# Patient Record
Sex: MALE | Race: WHITE | URBAN - METROPOLITAN AREA
[De-identification: names, ages, dates, MRNs, and addresses within clinical notes are randomized per-mention and may not be internally consistent; named-entity substitution may affect disease eponyms.]

---

## 2017-12-08 ENCOUNTER — HOSPITAL ENCOUNTER (INPATIENT)
Facility: CLINIC | Age: 82
LOS: 3 days | Discharge: NURSING, REHAB OR LONG-TERM CARE | DRG: 071 | End: 2017-12-12
Attending: EMERGENCY MEDICINE | Admitting: INTERNAL MEDICINE
Payer: COMMERCIAL

## 2017-12-08 ENCOUNTER — MEDICAL CORRESPONDENCE (OUTPATIENT)
Dept: HEALTH INFORMATION MANAGEMENT | Facility: CLINIC | Age: 82
End: 2017-12-08

## 2017-12-08 DIAGNOSIS — E86.0 DEHYDRATION: ICD-10-CM

## 2017-12-08 DIAGNOSIS — G93.40 ENCEPHALOPATHY: Primary | ICD-10-CM

## 2017-12-08 DIAGNOSIS — R30.0 DYSURIA: ICD-10-CM

## 2017-12-08 DIAGNOSIS — R53.1 LEFT-SIDED WEAKNESS: ICD-10-CM

## 2017-12-08 DIAGNOSIS — R79.89 PRERENAL AZOTEMIA: ICD-10-CM

## 2017-12-08 DIAGNOSIS — D64.9 ANEMIA, UNSPECIFIED TYPE: ICD-10-CM

## 2017-12-08 DIAGNOSIS — F03.90 DEMENTIA WITHOUT BEHAVIORAL DISTURBANCE, UNSPECIFIED DEMENTIA TYPE: ICD-10-CM

## 2017-12-08 DIAGNOSIS — M19.90 ARTHRITIS: ICD-10-CM

## 2017-12-08 LAB
ALBUMIN UR-MCNC: NEGATIVE MG/DL
ANION GAP SERPL CALCULATED.3IONS-SCNC: 8 MMOL/L (ref 3–14)
APPEARANCE UR: CLEAR
BACTERIA #/AREA URNS HPF: ABNORMAL /HPF
BASOPHILS # BLD AUTO: 0 10E9/L (ref 0–0.2)
BASOPHILS NFR BLD AUTO: 0.1 %
BILIRUB UR QL STRIP: NEGATIVE
BUN SERPL-MCNC: 23 MG/DL (ref 7–30)
CALCIUM SERPL-MCNC: 8.6 MG/DL (ref 8.5–10.1)
CHLORIDE SERPL-SCNC: 101 MMOL/L (ref 94–109)
CO2 SERPL-SCNC: 25 MMOL/L (ref 20–32)
COLOR UR AUTO: YELLOW
CREAT SERPL-MCNC: 1.19 MG/DL (ref 0.66–1.25)
DIFFERENTIAL METHOD BLD: ABNORMAL
EOSINOPHIL # BLD AUTO: 0 10E9/L (ref 0–0.7)
EOSINOPHIL NFR BLD AUTO: 0.4 %
ERYTHROCYTE [DISTWIDTH] IN BLOOD BY AUTOMATED COUNT: 13.9 % (ref 10–15)
GFR SERPL CREATININE-BSD FRML MDRD: 57 ML/MIN/1.7M2
GLUCOSE SERPL-MCNC: 120 MG/DL (ref 70–99)
GLUCOSE UR STRIP-MCNC: NEGATIVE MG/DL
HCT VFR BLD AUTO: 30.5 % (ref 40–53)
HGB BLD-MCNC: 9.9 G/DL (ref 13.3–17.7)
HGB UR QL STRIP: NEGATIVE
IMM GRANULOCYTES # BLD: 0 10E9/L (ref 0–0.4)
IMM GRANULOCYTES NFR BLD: 0.1 %
INTERPRETATION ECG - MUSE: NORMAL
KETONES UR STRIP-MCNC: NEGATIVE MG/DL
LEUKOCYTE ESTERASE UR QL STRIP: ABNORMAL
LYMPHOCYTES # BLD AUTO: 0.8 10E9/L (ref 0.8–5.3)
LYMPHOCYTES NFR BLD AUTO: 11.3 %
MCH RBC QN AUTO: 28.6 PG (ref 26.5–33)
MCHC RBC AUTO-ENTMCNC: 32.5 G/DL (ref 31.5–36.5)
MCV RBC AUTO: 88 FL (ref 78–100)
MONOCYTES # BLD AUTO: 0.5 10E9/L (ref 0–1.3)
MONOCYTES NFR BLD AUTO: 7.4 %
NEUTROPHILS # BLD AUTO: 5.8 10E9/L (ref 1.6–8.3)
NEUTROPHILS NFR BLD AUTO: 80.7 %
NITRATE UR QL: NEGATIVE
NRBC # BLD AUTO: 0 10*3/UL
NRBC BLD AUTO-RTO: 0 /100
PH UR STRIP: 8 PH (ref 5–7)
PLATELET # BLD AUTO: 189 10E9/L (ref 150–450)
POTASSIUM SERPL-SCNC: 4.8 MMOL/L (ref 3.4–5.3)
RBC # BLD AUTO: 3.46 10E12/L (ref 4.4–5.9)
RBC #/AREA URNS AUTO: ABNORMAL /HPF
SODIUM SERPL-SCNC: 134 MMOL/L (ref 133–144)
SOURCE: ABNORMAL
SP GR UR STRIP: 1.01 (ref 1–1.03)
UROBILINOGEN UR STRIP-ACNC: 0.2 EU/DL (ref 0.2–1)
WBC # BLD AUTO: 7.3 10E9/L (ref 4–11)
WBC #/AREA URNS AUTO: ABNORMAL /HPF

## 2017-12-08 PROCEDURE — 93005 ELECTROCARDIOGRAM TRACING: CPT

## 2017-12-08 PROCEDURE — 25000132 ZZH RX MED GY IP 250 OP 250 PS 637: Performed by: INTERNAL MEDICINE

## 2017-12-08 PROCEDURE — 96375 TX/PRO/DX INJ NEW DRUG ADDON: CPT

## 2017-12-08 PROCEDURE — 87086 URINE CULTURE/COLONY COUNT: CPT | Performed by: EMERGENCY MEDICINE

## 2017-12-08 PROCEDURE — 25000128 H RX IP 250 OP 636: Performed by: EMERGENCY MEDICINE

## 2017-12-08 PROCEDURE — 80048 BASIC METABOLIC PNL TOTAL CA: CPT | Performed by: EMERGENCY MEDICINE

## 2017-12-08 PROCEDURE — G0378 HOSPITAL OBSERVATION PER HR: HCPCS

## 2017-12-08 PROCEDURE — 87088 URINE BACTERIA CULTURE: CPT | Performed by: EMERGENCY MEDICINE

## 2017-12-08 PROCEDURE — 99285 EMERGENCY DEPT VISIT HI MDM: CPT | Mod: 25

## 2017-12-08 PROCEDURE — 96365 THER/PROPH/DIAG IV INF INIT: CPT

## 2017-12-08 PROCEDURE — 81001 URINALYSIS AUTO W/SCOPE: CPT | Performed by: EMERGENCY MEDICINE

## 2017-12-08 PROCEDURE — 96361 HYDRATE IV INFUSION ADD-ON: CPT

## 2017-12-08 PROCEDURE — 27210995 ZZH RX 272: Performed by: EMERGENCY MEDICINE

## 2017-12-08 PROCEDURE — 85025 COMPLETE CBC W/AUTO DIFF WBC: CPT | Performed by: EMERGENCY MEDICINE

## 2017-12-08 PROCEDURE — 96376 TX/PRO/DX INJ SAME DRUG ADON: CPT

## 2017-12-08 PROCEDURE — 99220 ZZC INITIAL OBSERVATION CARE,LEVL III: CPT | Performed by: INTERNAL MEDICINE

## 2017-12-08 PROCEDURE — 87186 SC STD MICRODIL/AGAR DIL: CPT | Performed by: EMERGENCY MEDICINE

## 2017-12-08 PROCEDURE — 99207 ZZC MOONLIGHTING INDICATOR: CPT | Performed by: INTERNAL MEDICINE

## 2017-12-08 RX ORDER — ACETAMINOPHEN 500 MG
1000 TABLET ORAL 3 TIMES DAILY
Status: DISCONTINUED | OUTPATIENT
Start: 2017-12-08 | End: 2017-12-12 | Stop reason: HOSPADM

## 2017-12-08 RX ORDER — ONDANSETRON 2 MG/ML
4 INJECTION INTRAMUSCULAR; INTRAVENOUS ONCE
Status: COMPLETED | OUTPATIENT
Start: 2017-12-08 | End: 2017-12-08

## 2017-12-08 RX ORDER — ONDANSETRON 4 MG/1
4 TABLET, ORALLY DISINTEGRATING ORAL EVERY 6 HOURS PRN
Status: DISCONTINUED | OUTPATIENT
Start: 2017-12-08 | End: 2017-12-12 | Stop reason: HOSPADM

## 2017-12-08 RX ORDER — CEFTRIAXONE SODIUM 1 G/1
1 INJECTION, POWDER, FOR SOLUTION INTRAMUSCULAR; INTRAVENOUS EVERY 24 HOURS
Status: DISCONTINUED | OUTPATIENT
Start: 2017-12-09 | End: 2017-12-12 | Stop reason: HOSPADM

## 2017-12-08 RX ORDER — SODIUM CHLORIDE 9 MG/ML
INJECTION, SOLUTION INTRAVENOUS CONTINUOUS
Status: DISCONTINUED | OUTPATIENT
Start: 2017-12-08 | End: 2017-12-10

## 2017-12-08 RX ORDER — ONDANSETRON 2 MG/ML
4 INJECTION INTRAMUSCULAR; INTRAVENOUS EVERY 6 HOURS PRN
Status: DISCONTINUED | OUTPATIENT
Start: 2017-12-08 | End: 2017-12-12 | Stop reason: HOSPADM

## 2017-12-08 RX ORDER — HYDROCODONE BITARTRATE AND ACETAMINOPHEN 5; 325 MG/1; MG/1
1-2 TABLET ORAL EVERY 4 HOURS PRN
Status: DISCONTINUED | OUTPATIENT
Start: 2017-12-08 | End: 2017-12-11

## 2017-12-08 RX ORDER — ALUMINA, MAGNESIA, AND SIMETHICONE 2400; 2400; 240 MG/30ML; MG/30ML; MG/30ML
15 SUSPENSION ORAL
Status: DISCONTINUED | OUTPATIENT
Start: 2017-12-08 | End: 2017-12-12 | Stop reason: HOSPADM

## 2017-12-08 RX ORDER — LANOLIN ALCOHOL/MO/W.PET/CERES
3 CREAM (GRAM) TOPICAL AT BEDTIME
Status: DISCONTINUED | OUTPATIENT
Start: 2017-12-08 | End: 2017-12-12 | Stop reason: HOSPADM

## 2017-12-08 RX ORDER — METOPROLOL TARTRATE 25 MG/1
25 TABLET, FILM COATED ORAL DAILY
Status: DISCONTINUED | OUTPATIENT
Start: 2017-12-09 | End: 2017-12-12 | Stop reason: HOSPADM

## 2017-12-08 RX ORDER — SERTRALINE HYDROCHLORIDE 25 MG/1
25 TABLET, FILM COATED ORAL DAILY
Status: DISCONTINUED | OUTPATIENT
Start: 2017-12-09 | End: 2017-12-12 | Stop reason: HOSPADM

## 2017-12-08 RX ORDER — NALOXONE HYDROCHLORIDE 0.4 MG/ML
.1-.4 INJECTION, SOLUTION INTRAMUSCULAR; INTRAVENOUS; SUBCUTANEOUS
Status: DISCONTINUED | OUTPATIENT
Start: 2017-12-08 | End: 2017-12-09

## 2017-12-08 RX ORDER — ACETAMINOPHEN 325 MG/1
650 TABLET ORAL EVERY 4 HOURS PRN
Status: DISCONTINUED | OUTPATIENT
Start: 2017-12-08 | End: 2017-12-12 | Stop reason: HOSPADM

## 2017-12-08 RX ORDER — TAMSULOSIN HYDROCHLORIDE 0.4 MG/1
0.4 CAPSULE ORAL DAILY
Status: DISCONTINUED | OUTPATIENT
Start: 2017-12-09 | End: 2017-12-12 | Stop reason: HOSPADM

## 2017-12-08 RX ORDER — ASPIRIN 81 MG/1
81 TABLET ORAL DAILY
Status: DISCONTINUED | OUTPATIENT
Start: 2017-12-09 | End: 2017-12-12 | Stop reason: HOSPADM

## 2017-12-08 RX ORDER — LIDOCAINE 40 MG/G
CREAM TOPICAL
Status: DISCONTINUED | OUTPATIENT
Start: 2017-12-08 | End: 2017-12-08

## 2017-12-08 RX ORDER — LISINOPRIL 20 MG/1
20 TABLET ORAL DAILY
Status: DISCONTINUED | OUTPATIENT
Start: 2017-12-09 | End: 2017-12-09

## 2017-12-08 RX ORDER — ACETAMINOPHEN 650 MG/1
650 SUPPOSITORY RECTAL EVERY 4 HOURS PRN
Status: DISCONTINUED | OUTPATIENT
Start: 2017-12-08 | End: 2017-12-12 | Stop reason: HOSPADM

## 2017-12-08 RX ORDER — CEFTRIAXONE SODIUM 1 G/1
1 INJECTION, POWDER, FOR SOLUTION INTRAMUSCULAR; INTRAVENOUS ONCE
Status: COMPLETED | OUTPATIENT
Start: 2017-12-08 | End: 2017-12-08

## 2017-12-08 RX ORDER — SENNOSIDES 8.6 MG
1 TABLET ORAL DAILY PRN
Status: DISCONTINUED | OUTPATIENT
Start: 2017-12-08 | End: 2017-12-12 | Stop reason: HOSPADM

## 2017-12-08 RX ADMIN — SODIUM CHLORIDE: 9 INJECTION, SOLUTION INTRAVENOUS at 18:42

## 2017-12-08 RX ADMIN — MELATONIN 3 MG: 3 TAB ORAL at 22:42

## 2017-12-08 RX ADMIN — SODIUM CHLORIDE: 9 INJECTION, SOLUTION INTRAVENOUS at 22:34

## 2017-12-08 RX ADMIN — ONDANSETRON 4 MG: 2 INJECTION INTRAMUSCULAR; INTRAVENOUS at 20:57

## 2017-12-08 RX ADMIN — CEFTRIAXONE SODIUM 1 G: 10 INJECTION, POWDER, FOR SOLUTION INTRAVENOUS at 20:43

## 2017-12-08 RX ADMIN — ONDANSETRON 4 MG: 2 INJECTION INTRAMUSCULAR; INTRAVENOUS at 19:02

## 2017-12-08 ASSESSMENT — ENCOUNTER SYMPTOMS
DIZZINESS: 0
FEVER: 0
SHORTNESS OF BREATH: 0
ABDOMINAL PAIN: 0

## 2017-12-08 NOTE — IP AVS SNAPSHOT
` ` Patient Information     Patient Name Sex     Olaf Norris ([id number]) Male 1920       Room Bed    6619 6619-02      Patient Demographics     Address Phone    [address]   [facility] [address] [phone] (Home)  NONE (Work)  NONE (Mobile)      Patient Ethnicity & Race     Ethnic Group Patient Race    American White      Emergency Contact(s)     Name Relation Home Work Mobile    PRINCESS NORRIS Son [phone] NONE [phone]    REBECCA NORRIS Son [phone] NONE [phone]    MADDI JOSE [phone] NONE [phone]      Documents on File        Status Date Received Description       Documents for the Patient    Affiliate Privacy placeholder   phase3    Consent for EHR Access Received 16     Insurance Card       External Medication Information Consent       Patient ID       [facility] Specified Other       Consent for Services/Privacy Notice - Hospital/Clinic Received () 16     Privacy Notice - [place] Received 16     Care Everywhere Prospective Auth Received 17     Consent for Services/Privacy Notice - Hospital/Clinic Received 17     Consent for Services/Privacy Notice - Hospital/Clinic          Documents for the Encounter    CMS IM for Patient Signature Received 17   [facility]      Admission Information     Attending Provider Admitting Provider Admission Type Admission Date/Time    Vesna Ríos MD Pedrotty, Dawn M, MD Emergency 17  1549    Discharge Date Hospital Service Auth/Cert Status Service Area     Hospitalist [facility] SERVICES    Unit Room/Bed Admission Status       [address] UNIT [id number] Admission (Confirmed)       Admission     Complaint    Weakness, Weakness, Encephalopathy      Hospital Account     Name Acct ID Class Status Primary Coverage    Olaf Norris [id number] Inpatient Open MEDICARE - MEDICARE PT A ONLY            Guarantor Account (for Hospital Account  #[id number])     Name Relation to Pt Service Area Active? Acct Type    Olaf Fierro Self FCS Yes Personal/Family    Address Phone          [address] [place] [address] [phone](H)  NONE(O)              Coverage Information (for Hospital Account #[id number])     F/O Payor/Plan Precert #    MEDICARE/MEDICARE PT A ONLY     Subscriber Subscriber #    Olaf Fierro [id number]    Address Phone    [employer] CLAIMS  [address]  [place] [address] [phone]

## 2017-12-08 NOTE — ED NOTES
Bed: ED20  Expected date:   Expected time:   Means of arrival:   [facility]  [facility] Stroke symptoms like symptoms [id number]

## 2017-12-08 NOTE — IP AVS SNAPSHOT
"          [place] [address] [facility] UNIT: [phone]                                              INTERAGENCY TRANSFER FORM - LAB / IMAGING / EKG / EMG RESULTS   2017                    Hospital Admission Date: 2017  FRANKIE NORRIS   : 1920  Sex: Male        Attending Provider: Vesna Ríos MD     Allergies:  No Known Allergies    Infection:  None   Service:  HOSPITALIST    Ht:  1.676 m (5' 6\")   Wt:  79.4 kg (175 lb)   Admission Wt:  79.4 kg (175 lb)    BMI:  28.25 kg/m 2   BSA:  1.92 m 2            Patient PCP Information     Provider PCP Type    RUSTY ANDERSON General         Lab Results - 3 Days      Urine Culture Aerobic Bacterial [[id number]] (Abnormal)  Resulted: 17 1029, Result status: Final result    Ordering provider: Lashay Marino MD  17 1900 Resulting lab: [facility]    Specimen Information    Type Source Collected On   Catheterized Urine  17 1656          Components       Value Reference Range Flag Lab   Specimen Description Catheterized Urine      Special Requests Specimen received in preservative   75   Culture Micro --  A 225   Result:         10,000 to 50,000 colonies/mL  Coagulase negative Staphylococcus     Culture Micro --   225   Result:         Susceptibility testing requested by  Dr. Lashay Marino, for Bactram, Ceftriaxone, Cefpodoxime on the Coagulase negative staph,   @1620 17.DH.     Culture Micro Unable to test for Ceftriaxone and Cephpodoxime   225   Result:     Culture Micro Oxacillin susceptible Staph sp. are susceptible to the Cephalosporins   225            Basic metabolic panel [[id number]] (Abnormal)  Resulted: 17 0846, Result status: Final result    Ordering provider: Vesna Ríos MD  17 0001 Resulting lab: [facility]    Specimen Information    Type Source Collected On   Blood  17 0808          Components       Value Reference Range Flag Lab "   Sodium 136 133 - 144 mmol/L  FrStHsLb   Potassium 3.4 3.4 - 5.3 mmol/L  FrStHsLb   Chloride 104 94 - 109 mmol/L  FrStHsLb   Carbon Dioxide 22 20 - 32 mmol/L  FrStHsLb   Anion Gap 10 3 - 14 mmol/L  FrStHsLb   Glucose 99 70 - 99 mg/dL  FrStHsLb   Urea Nitrogen 20 7 - 30 mg/dL  FrStHsLb   Creatinine 1.01 0.66 - 1.25 mg/dL  FrStHsLb   GFR Estimate 68 >60 mL/min/1.7m2  FrStHsLb   Comment:  Non  GFR Calc   GFR Estimate If Black 83 >60 mL/min/1.7m2  FrStHsLb   Comment:  African American GFR Calc   Calcium 8.1 8.5 - 10.1 mg/dL L FrStHsLb            CBC with platelets [[id number]] (Abnormal)  Resulted: 12/12/17 0822, Result status: Final result    Ordering provider: Vesna Ríos MD  12/12/17 0001 Resulting lab: [facility]    Specimen Information    Type Source Collected On   Blood  12/12/17 0808          Components       Value Reference Range Flag Lab   WBC 8.1 4.0 - 11.0 10e9/L  FrStHsLb   RBC Count 2.96 4.4 - 5.9 10e12/L L FrStHsLb   Hemoglobin 8.6 13.3 - 17.7 g/dL L FrStHsLb   Hematocrit 25.9 40.0 - 53.0 % L FrStHsLb   MCV 88 78 - 100 fl  FrStHsLb   MCH 29.1 26.5 - 33.0 pg  FrStHsLb   MCHC 33.2 31.5 - 36.5 g/dL  FrStHsLb   RDW 14.0 10.0 - 15.0 %  FrStHsLb   Platelet Count 127 150 - 450 10e9/L L FrStHsLb            Basic metabolic panel [[id number]] (Abnormal)  Resulted: 12/10/17 1038, Result status: Final result    Ordering provider: Vesna Ríos MD  12/10/17 0000 Resulting lab: [facility]    Specimen Information    Type Source Collected On   Blood  12/10/17 1000          Components       Value Reference Range Flag Lab   Sodium 139 133 - 144 mmol/L  FrStHsLb   Potassium 3.6 3.4 - 5.3 mmol/L  FrStHsLb   Chloride 108 94 - 109 mmol/L  FrStHsLb   Carbon Dioxide 21 20 - 32 mmol/L  FrStHsLb   Anion Gap 10 3 - 14 mmol/L  FrStHsLb   Glucose 138 70 - 99 mg/dL H FrStHsLb   Urea Nitrogen 27 7 - 30 mg/dL  FrStHsLb   Creatinine 1.23 0.66 - 1.25 mg/dL  FrStHsLb   GFR Estimate  54 >60 mL/min/1.7m2 L FrStHsLb   Comment:  Non  GFR Calc   GFR Estimate If Black 66 >60 mL/min/1.7m2  FrStHsLb   Comment:  African American GFR Calc   Calcium 8.2 8.5 - 10.1 mg/dL L FrStHsLb            CBC with platelets differential [[id number]] (Abnormal)  Resulted: 12/10/17 1024, Result status: Final result    Ordering provider: Vesna Ríos MD  12/10/17 0000 Resulting lab: [facility]    Specimen Information    Type Source Collected On   Blood  12/10/17 1000          Components       Value Reference Range Flag Lab   WBC 7.5 4.0 - 11.0 10e9/L  FrStHsLb   RBC Count 3.04 4.4 - 5.9 10e12/L L FrStHsLb   Hemoglobin 8.7 13.3 - 17.7 g/dL L FrStHsLb   Hematocrit 26.7 40.0 - 53.0 % L FrStHsLb   MCV 88 78 - 100 fl  FrStHsLb   MCH 28.6 26.5 - 33.0 pg  FrStHsLb   MCHC 32.6 31.5 - 36.5 g/dL  FrStHsLb   RDW 13.9 10.0 - 15.0 %  FrStHsLb   Platelet Count 126 150 - 450 10e9/L L FrStHsLb   Diff Method Automated Method   FrStHsLb   % Neutrophils 79.5 %  FrStHsLb   % Lymphocytes 13.8 %  FrStHsLb   % Monocytes 6.5 %  FrStHsLb   % Eosinophils 0.0 %  FrStHsLb   % Basophils 0.1 %  FrStHsLb   % Immature Granulocytes 0.1 %  FrStHsLb   Nucleated RBCs 0 0 /100  FrStHsLb   Absolute Neutrophil 6.0 1.6 - 8.3 10e9/L  FrStHsLb   Absolute Lymphocytes 1.0 0.8 - 5.3 10e9/L  FrStHsLb   Absolute Monocytes 0.5 0.0 - 1.3 10e9/L  FrStHsLb   Absolute Eosinophils 0.0 0.0 - 0.7 10e9/L  FrStHsLb   Absolute Basophils 0.0 0.0 - 0.2 10e9/L  FrStHsLb   Abs Immature Granulocytes 0.0 0 - 0.4 10e9/L  FrStHsLb   Absolute Nucleated RBC 0.0   FrStHsLb            TSH with free T4 reflex [[id number]]  Resulted: 12/09/17 1310, Result status: Final result    Ordering provider: Vesna Ríos MD  12/09/17 0913 Resulting lab: [facility]    Specimen Information    Type Source Collected On     12/09/17 0913          Components       Value Reference Range Flag Lab   TSH 1.12 0.40 - 4.00 mU/L  FrStHsLb            Magnesium  [[id number]]  Resulted: 12/09/17 1306, Result status: Final result    Ordering provider: Vesna Ríos MD  12/09/17 0913 Resulting lab: [facility]    Specimen Information    Type Source Collected On     12/09/17 0913          Components       Value Reference Range Flag Lab   Magnesium 1.9 1.6 - 2.3 mg/dL  FrStHsLb            Comprehensive metabolic panel [[id number]] (Abnormal)  Resulted: 12/09/17 0945, Result status: Final result    Ordering provider: Vesna Ríos MD  12/09/17 0853 Resulting lab: [facility]    Specimen Information    Type Source Collected On   Blood  12/09/17 0913          Components       Value Reference Range Flag Lab   Sodium 134 133 - 144 mmol/L  FrStHsLb   Potassium 4.1 3.4 - 5.3 mmol/L  FrStHsLb   Chloride 104 94 - 109 mmol/L  FrStHsLb   Carbon Dioxide 21 20 - 32 mmol/L  FrStHsLb   Anion Gap 9 3 - 14 mmol/L  FrStHsLb   Glucose 108 70 - 99 mg/dL H FrStHsLb   Urea Nitrogen 24 7 - 30 mg/dL  FrStHsLb   Creatinine 1.28 0.66 - 1.25 mg/dL H FrStHsLb   GFR Estimate 52 >60 mL/min/1.7m2 L FrStHsLb   Comment:  Non  GFR Calc   GFR Estimate If Black 63 >60 mL/min/1.7m2  FrStHsLb   Comment:  African American GFR Calc   Calcium 7.9 8.5 - 10.1 mg/dL L FrStHsLb   Bilirubin Total 0.3 0.2 - 1.3 mg/dL  FrStHsLb   Albumin 3.0 3.4 - 5.0 g/dL L FrStHsLb   Protein Total 6.5 6.8 - 8.8 g/dL L FrStHsLb   Alkaline Phosphatase 69 40 - 150 U/L  FrStHsLb   ALT 20 0 - 70 U/L  FrStHsLb   AST 73 0 - 45 U/L H FrStHsLb            CBC with platelets [[id number]] (Abnormal)  Resulted: 12/09/17 0931, Result status: Final result    Ordering provider: Vesna Ríos MD  12/09/17 0853 Resulting lab: [facility]    Specimen Information    Type Source Collected On   Blood  12/09/17 0913          Components       Value Reference Range Flag Lab   WBC 8.4 4.0 - 11.0 10e9/L  FrStHsLb   RBC Count 2.93 4.4 - 5.9 10e12/L L FrStHsLb   Hemoglobin 8.4 13.3 - 17.7 g/dL L  FrStHsLb   Hematocrit 25.5 40.0 - 53.0 % L FrStHsLb   MCV 87 78 - 100 fl  FrStHsLb   MCH 28.7 26.5 - 33.0 pg  FrStHsLb   MCHC 32.9 31.5 - 36.5 g/dL  FrStHsLb   RDW 13.9 10.0 - 15.0 %  FrStHsLb   Platelet Count 159 150 - 450 10e9/L  FrStHsLb            Testing Performed By     Lab - Abbreviation Name Director Address Valid Date Range    14 - FrStHsLb [facility] [address] [facility] [address] 05/08/15 1057 - Present    75 - Unknown [facility] Unknown [address] [facility] [address] 01/15/15 1019 - Present    225 - Unknown [facility] Unknown [address] [facility] [address] 12/19/14 0954 - Present            Unresulted Labs     None      Encounter-Level Documents:     There are no encounter-level documents.      Order-Level Documents:     There are no order-level documents.

## 2017-12-08 NOTE — IP AVS SNAPSHOT
"[place] [address] MEDICAL SPECIALTY UNIT: [phone]                                              INTERAGENCY TRANSFER FORM - PHYSICIAN ORDERS   2017                    Hospital Admission Date: 2017  FRANKIE NORRIS   : 1920  Sex: Male        Attending Provider: Vesna Ríos MD     Allergies:  No Known Allergies    Infection:  None   Service:  HOSPITALIST    Ht:  1.676 m (5' 6\")   Wt:  79.4 kg (175 lb)   Admission Wt:  79.4 kg (175 lb)    BMI:  28.25 kg/m 2   BSA:  1.92 m 2            Patient PCP Information     Provider PCP Type    RUSTY ANDERSON [facility]      ED Clinical Impression     Diagnosis Description Comment Added By Time Added    Left-sided weakness [R53.1] Left-sided weakness [R53.1]  Lashay Marino MD 2017  7:01 PM    Prerenal azotemia [R79.89] Prerenal azotemia [R79.89]  Lashay Marino MD 2017  7:01 PM    Dehydration [E86.0] Dehydration [E86.0] Mild Lashay Marino MD 2017  7:01 PM    Dementia without behavioral disturbance, unspecified dementia type [F03.90] Dementia without behavioral disturbance, unspecified dementia type [F03.90]  Lashay Marino MD 2017  7:01 PM    Anemia, unspecified type [D64.9] Anemia, unspecified type [D64.9]  Lashay Marino MD 2017  7:02 PM      Hospital Problems as of 2017              Priority Class Noted POA    Weakness Medium  2017 Yes    Encephalopathy Medium  2017 Yes      Non-Hospital Problems as of 2017              Priority Class Noted    CVA (cerebral vascular accident) (H) Medium  2016    Multiple fractures of right foot, sequela Medium  2016    NSTEMI (non-ST elevated myocardial infarction) (H) Medium  2016    Atrial fibrillation with RVR (H) Medium  2016    Late effect of cerebrovascular accident Medium  2016    Essential hypertension Medium  2016    Dysphagia, unspecified dysphagia Medium  2016    Insomnia, unspecified " insomnia Medium  2/18/2016    Constipation, unspecified constipation type Medium  2/18/2016    Arthritis Medium  2/18/2016      Code Status History     Date Active Date Inactive Code Status Order ID Comments User Context    12/12/2017 12:25 PM  DNR/DNI [id number]  Lee Gold MD Outpatient    12/9/2017  7:49 PM 12/12/2017 12:25 PM DNR/DNI [id number]  Vesna Ríos MD Inpatient    12/8/2017 10:17 PM 12/9/2017  7:49 PM DNR/DNI [id number]  Vesna Ríos MD Inpatient    2/9/2016 10:56 AM 12/8/2017 10:17 PM DNR/DNI [id number]  Kishore Rodriguez DO Outpatient    2/8/2016 12:29 AM 2/9/2016 10:56 AM DNR/DNI [id number]  Michel Chase MD Inpatient         Medication Review      START taking        Dose / Directions Comments    cephALEXin 500 MG capsule   Commonly known as:  KEFLEX   Used for:  Dysuria        Dose:  500 mg   Take 1 capsule (500 mg) by mouth 3 times daily for 5 days   Quantity:  15 capsule   Refills:  0          CONTINUE these medications which may have CHANGED, or have new prescriptions. If we are uncertain of the size of tablets/capsules you have at home, strength may be listed as something that might have changed.        Dose / Directions Comments    metoprolol 25 MG tablet   Commonly known as:  LOPRESSOR   This may have changed:  additional instructions   Used for:  Atrial fibrillation, unspecified        Dose:  25 mg   Take 1 tablet (25 mg) by mouth daily   Quantity:  60 tablet   Refills:  0        sertraline 25 MG tablet   Commonly known as:  ZOLOFT   This may have changed:  additional instructions   Used for:  Depression        Dose:  25 mg   Take 1 tablet (25 mg) by mouth daily   Quantity:  30 tablet   Refills:  0        traMADol 50 MG tablet   Commonly known as:  ULTRAM   This may have changed:  reasons to take this   Used for:  Arthritis        Dose:  100 mg   Take 2 tablets (100 mg) by mouth 3 times daily as needed   Quantity:  30 tablet   Refills:  0          CONTINUE these  medications which have NOT CHANGED        Dose / Directions Comments    ACETAMINOPHEN PO        Dose:  1000 mg   Take 1,000 mg by mouth 3 times daily   Refills:  0        ALEVE -25 MG Tabs   Generic drug:  Naproxen Sod-Diphenhydramine        Dose:  1 tablet   Take 1 tablet by mouth At Bedtime   Refills:  0        alum & mag hydroxide-simethicone 400-400-40 MG/5ML Susp suspension   Commonly known as:  MYLANTA ES/MAALOX  ES        Dose:  15 mL   Take 15 mLs by mouth every 2 hours as needed for heartburn   Refills:  0        AMOXICILLIN PO        Take 2,000 mg by mouth one hour before dental appointment   Refills:  0        ASPIRIN EC PO        Dose:  81 mg   Take 81 mg by mouth daily   Refills:  0        calcium-vitamin D 250-125 MG-UNIT Tabs per tablet   Commonly known as:  OSCAL        Dose:  2 tablet   Take 2 tablets by mouth 2 times daily   Refills:  0        LC-4 LIDOCAINE EX        Externally apply topically 4 times daily as needed (pain) 4% Ointment   Refills:  0        LISINOPRIL PO        Dose:  10 mg   Take 10 mg by mouth daily (Patient takes 1/2 of 20 mg tablet)   Refills:  0        MELATONIN PO        Dose:  3 mg   Take 3 mg by mouth At Bedtime   Refills:  0        MENTHOL-METHYL SALICYLATE EX        Apply thin layer 3 times daily as needed to low back, hip and/or knee for pain. Do not use at the same time as lidocaine ointment   Refills:  0        nystatin cream   Commonly known as:  MYCOSTATIN        Apply topically 2 times daily Apply to redness in groin folds/lower abdomen   Refills:  0        OMEPRAZOLE PO        Dose:  40 mg   Take 40 mg by mouth 2 times daily (Patient takes two 20mg capsules)   Refills:  0        PRESERVISION AREDS 2 Caps        Dose:  1 tablet   Take 1 tablet by mouth 2 times daily   Refills:  0        psyllium Packet   Commonly known as:  METAMUCIL/KONSYL        Dose:  1 packet   Take 1 packet by mouth daily as needed for constipation   Refills:  0        * sennosides 8.6 MG  tablet   Commonly known as:  SENOKOT        Dose:  2 tablet   Take 2 tablets by mouth daily   Refills:  0        * sennosides 8.6 MG tablet   Commonly known as:  SENOKOT        Take by mouth daily as needed for constipation   Refills:  0        tamsulosin 0.4 MG capsule   Commonly known as:  FLOMAX        Dose:  0.4 mg   Take 0.4 mg by mouth daily   Refills:  0        VITAMIN D (CHOLECALCIFEROL) PO        Dose:  1000 Units   Take 1,000 Units by mouth daily   Refills:  0        * Notice:  This list has 2 medication(s) that are the same as other medications prescribed for you. Read the directions carefully, and ask your doctor or other care provider to review them with you.              Further instructions from your care team       D/C to [facility], phone [phone]    Summary of Visit     Reason for your hospital stay       Acute encephalopathy due to UTI             After Care     Activity - Up with nursing assistance           Advance Diet as Tolerated       Follow this diet upon discharge: Orders Placed This Encounter      Room Service      Combination Diet Dysphagia Diet Level 2: Mechan Altered; Thin Liquids (water, ice chips, juice, milk gelatin, ice cream, etc)         Fall precautions           General info for SNF       Length of Stay Estimate: Short Term Care: Estimated # of Days <30  Condition at Discharge: Improving  Level of care:skilled   Rehabilitation Potential: Good  Admission H&P remains valid and up-to-date: Yes  Recent Chemotherapy: N/A  Use Nursing Home Standing Orders: N/A       Mantoux instructions       Give two-step Mantoux (PPD) Per Facility Policy Yes             Referrals     Occupational Therapy Adult Consult       Evaluate and treat as clinically indicated.    Reason:       Physical Therapy Adult Consult       Evaluate and treat as clinically indicated.    Reason:             Your next 10 appointments already scheduled     Dec 13, 2017  5:30 AM CST   Inpatient Meal Follow Up  Therapy with Teri Lucas, SLP   [facility] ([facility])    [address]  [place] [address] [phone]              Follow-Up Appointment Instructions     Future Labs/Procedures    Follow Up and recommended labs and tests     Comments:    Follow up with Nursing home physician.      Follow-Up Appointment Instructions     Follow Up and recommended labs and tests       Follow up with Nursing home physician.             Statement of Approval     Ordered          12/12/17 1226  I have reviewed and agree with all the recommendations and orders detailed in this document.  EFFECTIVE NOW     Approved and electronically signed by:  Lee Gold MD

## 2017-12-08 NOTE — IP AVS SNAPSHOT
[place] [address] [facility]    [address] [place] [address]    Phone:  [phone]                                       After Visit Summary   12/8/2017    Olaf Fierro    MRN: [id number]           After Visit Summary Signature Page     I have received my discharge instructions, and my questions have been answered. I have discussed any challenges I see with this plan with the nurse or doctor.    ..........................................................................................................................................  Patient/Patient Representative Signature      ..........................................................................................................................................  Patient Representative Print Name and Relationship to Patient    ..................................................               ................................................  Date                                            Time    ..........................................................................................................................................  Reviewed by Signature/Title    ...................................................              ..............................................  Date                                                            Time

## 2017-12-08 NOTE — IP AVS SNAPSHOT
` `     [place] [address] [facility]: [phone]                 INTERAGENCY TRANSFER FORM - NOTES (H&P, Discharge Summary, Consults, Procedures, Therapies)   2017                    Hospital Admission Date: 2017  FRANKIE NORRIS   : 1920  Sex: Male        Patient PCP Information     Provider PCP Type    RUSTY ANDERSON General         History & Physicals      H&P by Vesna Ríos MD at 2017  8:23 PM     Author:  Vesna Ríos MD Service:  Hospitalist Author Type:  Physician    Filed:  2017 10:50 PM Date of Service:  2017  8:23 PM Creation Time:  2017  8:23 PM    Status:  Signed :  Vesna Ríos MD (Physician)         [facility]    History and Physical  Hospitalist       Date of Admission:  2017    Assessment & Plan   Frankie Norris is a 89 year old male who presents with[DP1.1] new altered mental status worsening 12/8 AM.  The facility in where the patient lives called the family to discuss the new altered changes.  He lives in a facility that specializes in memory disorders.  He lives independently within the facility but has been having a more difficult time recently with independent transfers from his motorized wheelchair.  This change is acute but it is unclear if it an illness or repeat CVA.     Plan:[DP1.2]  Encephalopathy/Altered mental status  - Ddx includes illness (UTI) vs metabolic vs stroke  - UA was positive, started Ceftriaxone and sent culture in ED  - Discussed options with the family and given the patients wishes he would not want a CT or MRI at this point to further evaluate if this is the new CVA.  No HCT was done at this time.  It was recommended that neurology be consulted.  The family understands that any further imaging will just be diagnositic and not therapeutic as the window for treatment is narrow.    - Labs daily    Increased weakness in Left upper extremity  - Concern possibly 3rd  stroke  - Discussed if it is an infection this could cause recrudesce of his prior strokes.   - PT/OT consulted[DP1.3]    Arthritis  - Leading to stiffness especially in lower extremities  - Defer to PT/OT eval    Benign prostatic hypertrophy  - Continue home medications  - Able to urinate in the ED without much difficul    Cerebral infarction x2  - Weakness and altered state could be from a new stroke or recrudesce of the old 2/2 an infection    CKD (Baseline 1-1.2)  - Follow Cr and dose medications renally    GERD  - Has some nausea which is treated with Zofran  - Continue omprazole    Hypertension   - Goal SBP <180  - Continue lisinopril and metoprolol    Insomnia  - Takes tylenol PM at home  - Will hold off on other sedation medications at this time given his encephalopathy but he may be able to tolerate a low dose of something[DP1.4]    DVT Prophylaxis:[DP1.1] Pneumatic Compression Devices[DP1.2]  Code Status:[DP1.1] DNR / DNI[DP1.2]    Disposition: Expected discharge[DP1.1] once symptoms are addressed and pending on resolution if he will need assistance with more advanced care placement.[DP1.2]    Vesna Ríos MD[DP1.1], PhD[DP1.2]    Primary Care Physician   RUSTY ANDERSON    Chief Complaint[DP1.1]   Facility called family and they noted worsening mental status and possible increased weakness on the left upper extremity.  He was brought in for evaluation.    History is obtained from the patient, electronic health record, patient's children and patient's family[DP1.2]    History of Present Illness   Olaf Fierro is a 89 year old male who presents with[DP1.1] a past medical history significant for arthritis, benign prostatic hypertrophy, cerebral infarction, CKD, GERD, hypertension and insomnia.  He was brought in today because he has worsening encephalopathy.  He had several prior CVA and there is concern this is a repeat.  The patient is able to answer basic questions but is often not felt to be  fully involved in the conversation which per his family is a new change this week.  He was last seen by family normal on Wednesday, his birthday where he turned 89yo.      He had been able to be mobile with a motorized scooter.  He lives in an assisted living for memory impairment but they are not skilled nursing and only provide help.  His family is concerned that it has become too difficult for him to transfer safely or care for himself safely.[DP1.4]       Past Medical History[DP1.1]    I have reviewed this patient's medical history and updated it with pertinent information if needed.[DP1.2]   Past Medical History:   Diagnosis Date     Arthritis      Benign prostatic hypertrophy      Cerebral infarction (H)      CKD (chronic kidney disease)      GERD (gastroesophageal reflux disease)      Hypertension      Insomnia[DP1.5]        Past Surgical History[DP1.1]   I have reviewed this patient's surgical history and updated it with pertinent information if needed.[DP1.2]  Past Surgical History:   Procedure Laterality Date     CATARACT IOL, RT/LT       ORTHOPEDIC SURGERY      right shoulder arthroplasty     ORTHOPEDIC SURGERY      lleft knee surgery[DP1.5]       Prior to Admission Medications   Prior to Admission Medications   Prescriptions Last Dose Informant Patient Reported? Taking?   ACETAMINOPHEN PO Unknown at Unknown time Pharmacy Yes No   Sig: Take 1,000 mg by mouth 3 times daily   AMOXICILLIN PO Unknown at Unknown time Pharmacy Yes No   Sig: Take 2,000 mg by mouth one hour before dental appointment   ASPIRIN EC PO Unknown at Unknown time Pharmacy Yes No   Sig: Take 81 mg by mouth daily   LISINOPRIL PO Unknown at Unknown time Pharmacy Yes No   Sig: Take 20 mg by mouth daily (Patient takes 1/2 of 40 mg tablets)   MELATONIN PO Unknown at Unknown time Pharmacy Yes No   Sig: Take 3 mg by mouth At Bedtime    MENTHOL-METHYL SALICYLATE EX Unknown at Unknown time Pharmacy Yes No   Sig: Apply thin layer 3 times daily as  needed to low back, hip and/or knee for pain. Do not use at the same time as lidocaine ointment   Multiple Vitamins-Minerals (PRESERVISION AREDS 2) CAPS Unknown at Unknown time Pharmacy Yes No   Sig: Take 1 tablet by mouth 2 times daily   OMEPRAZOLE PO Unknown at Unknown time Pharmacy Yes No   Sig: Take 40 mg by mouth 2 times daily (Patient takes two 20mg capsules)   VITAMIN D, CHOLECALCIFEROL, PO  Pharmacy Yes No   Sig: Take 1,000 Units by mouth daily   alum & mag hydroxide-simethicone (MYLANTA ES/MAALOX  ES) 400-400-40 MG/5ML SUSP Unknown at Unknown time Pharmacy Yes No   Sig: Take 15 mLs by mouth every 2 hours as needed for heartburn   calcium-vitamin D (OSCAL) 250-125 MG-UNIT TABS per tablet Unknown at Unknown time Pharmacy Yes No   Sig: Take 2 tablets by mouth 2 times daily   lidocaine (XYLOCAINE) 5 % ointment Unknown at Unknown time Pharmacy Yes No   Sig: Apply topically as needed for moderate pain Apply moderate amount four times daily as needed for low back pain   metoprolol (LOPRESSOR) 25 MG tablet Unknown at Unknown time  No No   Sig: Take 1 tablet (25 mg) by mouth daily   oxyCODONE (ROXICODONE) 5 MG immediate release tablet Unknown at Unknown time  No No   Sig: Take 0.5-1 tablets (2.5-5 mg) by mouth 2 times daily as needed   psyllium (METAMUCIL/KONSYL) packet Unknown at Unknown time Pharmacy Yes No   Sig: Take 1 packet by mouth daily as needed for constipation   sennosides (SENOKOT) 8.6 MG tablet Unknown at Unknown time Pharmacy Yes No   Sig: Take 1 tablet by mouth daily as needed Also give 2 tabs po qd   sertraline (ZOLOFT) 25 MG tablet Unknown at Unknown time  No No   Sig: Take 1 tablet (25 mg) by mouth daily   tamsulosin (FLOMAX) 0.4 MG 24 hr capsule  Pharmacy Yes No   Sig: Take 0.4 mg by mouth daily      Facility-Administered Medications: None     Allergies   No Known Allergies    Social History[DP1.1]   I have reviewed this patient's social history and updated it with pertinent information if needed.  Olaf Fierro[DP1.2]  reports that he has never smoked. He does not have any smokeless tobacco history on file. He reports that he does not drink alcohol or use illicit drugs.[DP1.5]    Family History[DP1.1]   I have reviewed this patient's family history and updated it with pertinent information if needed.[DP1.2]   No family history on file.[DP1.5]    Review of Systems[DP1.1]   The 10 point Review of Systems is negative other than noted in the HPI or here.[DP1.2] He is unable to really be articulate about his symptoms.  He does seem to feel nauseous but denies other complications.[DP1.4]    Physical Exam   Temp: 99.1  F (37.3  C) Temp src: Oral BP: 180/81   Heart Rate: 112 Resp: 16 SpO2: 91 % O2 Device: None (Room air)    Vital Signs with Ranges  Temp:  [98.7  F (37.1  C)-99.1  F (37.3  C)] 99.1  F (37.3  C)  Heart Rate:  [] 112  Resp:  [16] 16  BP: (179-189)/(73-85) 180/81  SpO2:  [91 %-97 %] 91 %  175 lbs 0 oz[DP1.1]    Constitutional: Awake, alert, cooperative, no apparent distress, and appears stated age.  Eyes: Lids and lashes normal, pupils equal, round and reactive to light, extra ocular muscles intact, sclera clear, conjunctiva normal.  ENT: Normocephalic, without obvious abnormality, atraumatic, sinuses nontender on palpation, external ears without lesions, oral pharynx with moist mucus membranes, tonsils without erythema or exudates, gums normal and good dentition.  Respiratory: No increased work of breathing, good air exchange, clear to auscultation bilaterally, no crackles or wheezing.  Cardiovascular: Normal apical impulse, regular rate and rhythm, normal S1 and S2, no S3 or S4, and no murmur noted.  GI: No scars, normal bowel sounds, soft, non-distended, non-tender, no masses palpated, no hepatosplenomegaly.  Lymph/Hematologic: No cervical lymphadenopathy and no supraclavicular lymphadenopathy.  Skin: Bruising noted on extremities but denies falls, no overt bleeding, pale skin color, no  redness, warmth, or swelling, no rashes, no lesions, no abnormal moles, nails normal without discoloration or clubbing and no jaundice.  Musculoskeletal: There is no redness, warmth, or swelling of the joints.  Full range of motion noted.  Motor strength is 5 out of 5 all extremities bilaterally.  Tone is normal.  Neurologic: Awake, alert, oriented to name, place and time.  Cranial nerves II-XII are grossly intact.    Neuropsychiatric: Calm, normal eye contact, alert, normal affect, oriented to self, but not place or time.  Poor memory or past and recent event recollection, abnormal thought process c/w moderate to severe dementia.[DP1.2]    Data   Data reviewed today:  I personally reviewed[DP1.1] the EKG tracing showing normal sinus with SC prolongation. HR 91[DP1.2].    Recent Labs  Lab 12/08/17  1604   WBC 7.3   HGB 9.9*   MCV 88         POTASSIUM 4.8   CHLORIDE 101   CO2 25   BUN 23   CR 1.19   ANIONGAP 8   GIFTY 8.6   *       Imaging:  No results found for this or any previous visit (from the past 24 hour(s)).[DP1.1]     Revision History        User Key Date/Time User Provider Type Action    > DP1.4 12/8/2017 10:50 PM Vesna Ríos MD Physician Sign     DP1.3 12/8/2017 10:27 PM Vesna Ríos MD Physician      DP1.5 12/8/2017 10:21 PM Vesna Ríos MD Physician      DP1.2 12/8/2017 10:15 PM Vesna Ríos MD Physician      DP1.1 12/8/2017  8:23 PM Vesna Ríos MD Physician                   Discharge Summaries     No notes of this type exist for this encounter.         Consult Notes      Consults by Byron Wild MD at 12/9/2017 10:34 AM     Author:  Byron Wild MD Service:  Neurology Author Type:  Physician    Filed:  12/10/2017 10:51 AM Date of Service:  12/9/2017 10:34 AM Creation Time:  12/9/2017 10:25 AM    Status:  Signed :  Byron Wild MD (Physician)     Consult Orders:    1. Neurology IP Consult: Patient to be seen: Routine - within 24  hours; History of CVA and worsening left sided weakness; Consultant may enter orders: Yes [[id number]] ordered by Vesna Ríos MD at 12/08/17 2023                [facility]    Vascular Neurology / Stroke Consultation Note     Olaf Fierro MRN# [id number]   YOB: 1920 Age: 89 year old    Code Status:DNR/DNI   Date of Admission: 12/8/2017  Date of Consult: 12/09/2017    _________________________________   Primary Care Physician   RUSTY ANDERSON  ______________________________________________         Assessment & Plan   ______________________________________________[AZ1.1]  (G93.40) Encephalopathy  (primary encounter diagnosis)[AZ1.2]  --Unclear etiology  --Will get MRI brain  -----Highly suspecting stroke[AZ1.1]  (F03.90) Dementia without behavioral disturbance, unspecified dementia type[AZ1.2]  --Baseline dementia  #. DVT Prophylaxis  --Mechanical   #. PT/OT/Speech  --Start  evaluations  #. Nutrition / GI Prophylaxis  --Per recommendations of speech therapy    #. Code Status: DNR / DNI    Would benefit from palliative care evaluation to establish goals of care    ----------------------------------------------------------------------------------  ----------------------------------------------------------------------------------  Reason for consult: I was asked by Dr. Ríos to evaluate this patient for encephalopathy.    Chief Complaint   ______________________________________________  Confusion  History is obtained from the electronic health record    History of Present Illness   ______________________________________________  89 year old male who presents with a past medical history significant for arthritis, benign prostatic hypertrophy, cerebral infarction, CKD, GERD, hypertension and insomnia.  He was brought in 12/8/17 because he has worsening encephalopathy.  He had several prior CVA and there is concern this is a recurrent event.  The patient is able to answer  basic questions but is often not felt to be fully involved in the conversation which per his family is a new change this week.  He was last seen by family normal on Wednesday, his birthday where he turned 89yo.      I saw patient in 2016 for similar spell and stroke was suggested in the setting of atrial fibrillation  He had been able to be mobile with a motorized scooter.  He lives in an assisted living for memory impairment but they are not skilled nursing and only provide help.  His family is concerned that it has become too difficult for him to transfer safely or care for himself safely.   Overnight, No changes, still with slurred speech and very disoriented  Past Medical History    ______________________________________________  Past Medical History:   Diagnosis Date     Arthritis      Benign prostatic hypertrophy      Cerebral infarction (H)      CKD (chronic kidney disease)      GERD (gastroesophageal reflux disease)      Hypertension      Insomnia      Past Surgical History   ______________________________________________  Past Surgical History:   Procedure Laterality Date     CATARACT IOL, RT/LT       ORTHOPEDIC SURGERY      right shoulder arthroplasty     ORTHOPEDIC SURGERY      lleft knee surgery     Prior to Admission Medications   ______________________________________________  Prior to Admission Medications   Prescriptions Last Dose Informant Patient Reported? Taking?   ACETAMINOPHEN PO unknown Pharmacy Yes Yes   Sig: Take 1,000 mg by mouth 3 times daily   AMOXICILLIN PO unknown Pharmacy Yes Yes   Sig: Take 2,000 mg by mouth one hour before dental appointment   ASPIRIN EC PO unknown Pharmacy Yes Yes   Sig: Take 81 mg by mouth daily   LC-4 LIDOCAINE EX unknown  Yes Yes   Sig: Externally apply topically 4 times daily as needed (pain) 4% Ointment   LISINOPRIL PO unknown Pharmacy Yes Yes   Sig: Take 10 mg by mouth daily (Patient takes 1/2 of 20 mg tablet)   MELATONIN PO unknown Pharmacy Yes Yes   Sig: Take  3 mg by mouth At Bedtime    MENTHOL-METHYL SALICYLATE EX unknown Pharmacy Yes Yes   Sig: Apply thin layer 3 times daily as needed to low back, hip and/or knee for pain. Do not use at the same time as lidocaine ointment   Multiple Vitamins-Minerals (PRESERVISION AREDS 2) CAPS Unknown at Unknown time Pharmacy Yes No   Sig: Take 1 tablet by mouth 2 times daily   OMEPRAZOLE PO Unknown at Unknown time Pharmacy Yes No   Sig: Take 40 mg by mouth 2 times daily (Patient takes two 20mg capsules)   VITAMIN D, CHOLECALCIFEROL, PO  Pharmacy Yes No   Sig: Take 1,000 Units by mouth daily   alum & mag hydroxide-simethicone (MYLANTA ES/MAALOX  ES) 400-400-40 MG/5ML SUSP unknown Pharmacy Yes Yes   Sig: Take 15 mLs by mouth every 2 hours as needed for heartburn   calcium-vitamin D (OSCAL) 250-125 MG-UNIT TABS per tablet unknown Pharmacy Yes Yes   Sig: Take 2 tablets by mouth 2 times daily   metoprolol (LOPRESSOR) 25 MG tablet Unknown at Unknown time  No No   Sig: Take 1 tablet (25 mg) by mouth daily   oxyCODONE (ROXICODONE) 5 MG immediate release tablet Unknown at Unknown time  No No   Sig: Take 0.5-1 tablets (2.5-5 mg) by mouth 2 times daily as needed   psyllium (METAMUCIL/KONSYL) packet Unknown at Unknown time Pharmacy Yes No   Sig: Take 1 packet by mouth daily as needed for constipation   sennosides (SENOKOT) 8.6 MG tablet Unknown at Unknown time Pharmacy Yes No   Sig: Take 1 tablet by mouth daily as needed Also give 2 tabs po qd   sertraline (ZOLOFT) 25 MG tablet Unknown at Unknown time  No No   Sig: Take 1 tablet (25 mg) by mouth daily   tamsulosin (FLOMAX) 0.4 MG 24 hr capsule  Pharmacy Yes No   Sig: Take 0.4 mg by mouth daily      Facility-Administered Medications: None     Allergies   No Known Allergies    Social History   ______________________________________________  Social History     Social History     Marital status:      Spouse name: N/A     Number of children: N/A     Years of education: N/A     Social History  "Main Topics     Smoking status: Never Smoker     Smokeless tobacco: Not on file     Alcohol use No     Drug use: No     Sexual activity: Not on file     Other Topics Concern     Not on file     Social History Narrative         Family History   ______________________________________________  Reviewed and not felt to be contributory.    Review of Systems   ______________________________________________  Review of systems is not obtainable due to patient factors - confusion      Physical Exam   ______________________________________________  Weight:175 lbs 0 oz; Height:5' 6\"  Temp: 98.7  F (37.1  C) Temp src: Oral BP: 129/59 Pulse: 117 Heart Rate: 117 Resp: 16 SpO2: 93 % O2 Device: None (Room air)    General Appearance:  No acute distress  Neuro:       Mental Status Exam:   Awake, alert, disoriented X3. Speech slurred and language is minimal. Mental status is decreased       Cranial Nerves:  Pupils 3 mm, reactive. EOMI. Face sensation is normal. Face - right sided weakness.Tongue and uvula are midline. Other CN are normal           Motor:  Moves all extremities Tone and bulk are normal           Reflexes:  Normal DTR.Toes downgoing.        Sensory:  Untestable                  Coordination:   Untestable          Gait:  Untestable     Neck: no nuchal rigidity, normal thyroid. No carotid bruits.    Cardiovascular: Regular rate and rhythm, no m/r/g  Lungs: Clear to auscultation  Abdomen: Soft, not tender, not distended  Extremities: No clubbing, no cyanosis, no edema    Data   ______________________________________________  All Data personally reviewed:       Labs:   CBC RESULTS:     Recent Labs  Lab 12/09/17  0913 12/08/17  1604   WBC 8.4 7.3   RBC 2.93* 3.46*   HGB 8.4* 9.9*   HCT 25.5* 30.5*    189     Basic Metabolic Panel:   Recent Labs   Lab Test  12/09/17   0913  12/08/17   1604 02/15/16  02/07/16   1249   NA  134  134  132  135   POTASSIUM  4.1  4.8  4.0  4.5   CHLORIDE  104  101  101  101   CO2  21  25   " --   26   BUN  24  23  17  26   CR  1.28*  1.19  0.97  1.20   GLC  108*  120*  93  104*   GIFTY  7.9*  8.6  8.7  8.4*     Liver panel:  Recent Labs   Lab Test  12/09/17   0913   PROTTOTAL  6.5*   ALBUMIN  3.0*   BILITOTAL  0.3   ALKPHOS  69   AST  73*   ALT  20     INR:  Recent Labs   Lab Test  02/07/16   1249   INR  1.04      Lipid Profile:  Recent Labs   Lab Test  02/07/16   1710   CHOL  133   HDL  52   LDL  66   TRIG  73     Thyroid Panel:  Recent Labs   Lab Test  02/07/16   1710  02/07/16   1249   TSH  1.98  1.80      Vitamin B12: No lab results found.   Vitamin D level: No lab results found.  A1C: No lab results found.  Troponin I:   Recent Labs   Lab Test  02/08/16   1514  02/08/16   1107  02/08/16   0641  02/08/16   0105  02/07/16   1710  02/07/16   1249   TROPI  0.692*  0.938*  0.929*  0.166*  0.030  0.030     Ammonia: No lab results found.  CK: No lab results found.     CRP inflammation:   Recent Labs   Lab Test  02/07/16   1710   CRP  8.1*     ESR: No lab results found.    JAKE: No lab results found.    ANCA: No lab results found.   Drug Screen: No lab results found.  Alcohol level:No lab results found.  UA Results:  Recent Labs   Lab Test  12/08/17   1656   COLOR  Yellow   APPEARANCE  Clear   URINEGLC  Negative   URINEBILI  Negative   URINEKETONE  Negative   SG  1.015   UBLD  Negative   URINEPH  8.0*   PROTEIN  Negative   UROBILINOGEN  0.2   NITRITE  Negative   LEUKEST  Trace*   RBCU  O - 2   WBCU  5-10*     Most Recent 6 Bacteria Isolates From Any Culture (See EPIC Reports for Culture Details):  Recent Labs   Lab Test  12/08/17   1656   CULT  PENDING        Cardiac US:   --       Neurophysiology:   --       Imaging:   NO neuroimaging[AZ1.1]     Revision History        User Key Date/Time User Provider Type Action    > AZ1.2 12/10/2017 10:51 AM Byron Wild MD Physician Sign     AZ1.1 12/9/2017 10:25 AM Byron Wild MD Physician                      Progress Notes - Physician (Notes from  12/09/17 through 12/12/17)      Progress Notes by Yuri Silva at 12/12/2017 12:03 PM     Author:  Yuri Silva Service:  Social Work Author Type:      Filed:  12/12/2017 12:39 PM Date of Service:  12/12/2017 12:03 PM Creation Time:  12/12/2017 12:03 PM    Status:  In Progress :  Yuri Silva ()         GEETA  D) Patient may be ready for discharge to TCU today.  I) [place] back from [facility], who reports patient does have SNF coverage from the [facility], after his Medicare coverage ends. Patient now has a 3 day inpatient stay for Medicare coverage, so he would use that benefit first. Daria also confirms [facility] is a current [facility] contracted facility.  Misti at [facility] has accepted patient for today.   P) Will update Misti once MD has seen patient to determine if the D/C will occur today.  Will also update son.    PAS-RR  Per [facility] regulation, GEETA completed and submitted PAS-RR to [place] [employer] Direct Connect via the Senior LinkAge Line.  PAS-RR confirmation # is :[id number]    Further questions may be directed to Senior LinkAge Line at #[phone], option #4 for PAS-RR staff.[RH1.1]  Addendum  MD has completed D/C orders, which will be faxed to [facility] with the PAS and script. [facility] [place] operated by [facility] wheelchair ride to [facility] at 1530. Updated son, Eliseo, who agrees to this plan. He was informed patient will be billed for the wheelchair ride.[RH1.2]      Revision History        User Key Date/Time User Provider Type Action    > RH1.2 12/12/2017 12:39 PM Yuri Silva  Addend     RH1.1 12/12/2017 12:08 PM Yuri Silva  Sign            Progress Notes by Vesna Ríos MD at 12/11/2017  4:06 PM     Author:  Vesna Ríos MD Service:  Hospitalist Author Type:  Physician    Filed:  12/11/2017  4:23 PM Date of Service:  12/11/2017  4:06 PM Creation Time:  12/11/2017  4:06 PM    Status:  Signed :  Vesna Ríos MD  (Physician)         [facility]    Hospitalist Progress Note    Assessment & Plan   Olaf Fierro is a 89 year old male who was admitted on 12/8/2017. He presented with a past medical history significant for arthritis, benign prostatic hypertrophy, cerebral infarction x2 with paroxsymal atrial fibrillation, CKD, GERD, hypertension and insomnia.  He was brought to the ED because he has having worsening encephalopathy.  He had several prior CVAs and there is concern this is another CVA.  The patient is able to answer basic questions but is often not felt to be fully involved in the conversation which per his family is a new change this week.  He was last seen by family normal on Wednesday, his birthday where he turned 89yo. Of note, he had been able to be mobile with a motorized scooter.  He lives in an assisted living for memory impairment but they are not skilled nursing.  His family is concerned that it has become too difficult for him to transfer safely or care for himself safely.     Note:  Urine culture positive for 10,000 to 50,000 colonies/mL, Coagulase negative Staphylococcus and Susceptibility testing in progress.  We will continue IV antibiotics.  His mental status is much improved.    Plan:  Encephalopathy/Altered mental status- resolving  - Ddx includes illness (Coag negative Staph UTI) vs stroke  - Continue IV Ceftriaxone   - For stroke work up neuro was consulted and options were discussed with the family.  Given the patients wishes he would not want a CT or MRI at this point to further evaluate if this is the new CVA.  No HCT was done at this time.     Coagulase Negative Staph UTI, treat 12/8-12/15  - Continue with Ceftriaxone IV until susceptibility testing done  - Oral susceptibility is limited.  Prefer not to use nitrofurantoin or gentamicin in the elderly.  - Called [facility] lab ([phone]) and asked the lab to run susceptibility to Ceftriaxone, Cefpodoxime and Bactrim.  Will  start testing 12/12.     Increased weakness in Left upper extremity  - Concern possibly secondary to new TIA/CVA  - PT/OT consulted and recommend increased services from what he is currently receiving at [facility]    Cerebral infarction x2, concern for new CVA/TIA  - Weakness from prior CVAs  - Discussed with family TPA is not an option at this time.    - Family discussed they did not want an MRI at this time and this was discussed with Neuro.  The family understands that this is possibly another stroke.   - Family is most concerned with PT and OT evaluation for possible change in placement.      Arthritis/Hip pain  - Restarted tramadol 12/11  - Defer to PT/OT eval     Benign prostatic hypertrophy  - Continue home medications     CKD (Baseline 1-1.2)  - Follow Cr and dose medications renally     GERD  - Continue omprazole     Hypertension   - Goal SBP <180  - Continue lisinopril and metoprolol     Insomnia  - Takes tylenol PM at home     DVT Prophylaxis: Pneumatic Compression Devices  Code Status: DNR / DNI     Disposition: Expected discharge once PO antibiotic is identified for discharge.  Placement with TCU via [facility] coverage- appreciate SW help.    Vesna Ríos MD, PhD  Text Page       -Data reviewed today: I reviewed all new labs and imaging results over the last 24 hours. I personally reviewed no images or EKG's today.    Physical Exam   Temp: 97.5  F (36.4  C) Temp src: Oral BP: 150/77   Heart Rate: 69 Resp: 16 SpO2: 96 % O2 Device: None (Room air)    Vitals:    12/08/17 1555   Weight: 79.4 kg (175 lb)     Vital Signs with Ranges  Temp:  [97.5  F (36.4  C)-98.9  F (37.2  C)] 97.5  F (36.4  C)  Heart Rate:  [69-86] 69  Resp:  [16] 16  BP: (133-150)/(53-77) 150/77  SpO2:  [94 %-96 %] 96 %  I/O last 3 completed shifts:  In: 820 [P.O.:820]  Out: 550 [Urine:550]    Constitutional: Awake, cooperative, no apparent distress. Moderate to severe dementia noted.  Eyes: Lids and lashes normal, sclera clear, conjunctiva  normal.  ENT: Normocephalic, without obvious abnormality, atraumatic, sinuses nontender on palpation.  Very hard of hearing.  Noted skin cancer prior treated on scalp.  Respiratory: No increased work of breathing, good air exchange, clear to auscultation bilaterally, no crackles or wheezing.  Cardiovascular: Normal apical impulse, regular rate and rhythm, normal S1 and S2, no S3 or S4, and no murmur noted.  GI: No scars, normal bowel sounds, soft, non-distended, non-tender, no masses palpated, no hepatosplenomegaly.  Lymph/Hematologic: No cervical lymphadenopathy   Skin: Bruising noted on extremities but denies falls, no overt bleeding, pale skin color, no redness, warmth, or swelling, no rashes  Musculoskeletal: There is no redness, warmth, or swelling of the joints.    Neurologic: Awake, alert, oriented to name.    Neuropsychiatric: Calm, normal eye contact, alert, normal affect, oriented to self, but not place or time.  Poor memory or past and recent event recollection, abnormal thought process c/w moderate to severe dementia.        Medications        lisinopril (PRINIVIL/ZESTRIL) tablet 10 mg  10 mg Oral Daily     sodium chloride (PF)  3 mL Intracatheter Q8H     acetaminophen (TYLENOL) tablet 1,000 mg  1,000 mg Oral TID     aspirin EC EC tablet 81 mg  81 mg Oral Daily     calcium-vitamin D  2 tablet Oral BID     melatonin tablet 3 mg  3 mg Oral At Bedtime     metoprolol  25 mg Oral Daily     omeprazole (priLOSEC) CR capsule 40 mg  40 mg Oral BID     sertraline  25 mg Oral Daily     tamsulosin  0.4 mg Oral Daily     cholecalciferol  1,000 Units Oral Daily     cefTRIAXone  1 g Intravenous Q24H       Data     Recent Labs  Lab 12/10/17  1000 12/09/17  0913 12/08/17  1604   WBC 7.5 8.4 7.3   HGB 8.7* 8.4* 9.9*   MCV 88 87 88   * 159 189    134 134   POTASSIUM 3.6 4.1 4.8   CHLORIDE 108 104 101   CO2 21 21 25   BUN 27 24 23   CR 1.23 1.28* 1.19   ANIONGAP 10 9 8   GIFTY 8.2* 7.9* 8.6   * 108* 120*    ALBUMIN  --  3.0*  --    PROTTOTAL  --  6.5*  --    BILITOTAL  --  0.3  --    ALKPHOS  --  69  --    ALT  --  20  --    AST  --  73*  --        Imaging:  No results found for this or any previous visit (from the past 24 hour(s)).[DP1.1]     Revision History        User Key Date/Time User Provider Type Action    > DP1.1 12/11/2017  4:23 PM Vesna Ríos MD Physician Sign            Progress Notes by Yuri Silva at 12/11/2017 12:07 PM     Author:  Yuri Silva Service:  Social Work Author Type:      Filed:  12/11/2017  2:52 PM Date of Service:  12/11/2017 12:07 PM Creation Time:  12/11/2017 12:07 PM    Status:  Addendum :  Yuri Silva ()         GEETA  D) Following for D/C planning.  I) Received a call from REBECCA Ruth with the [facility] ([phone] and fax [phone]). Flory reports patient has a service connected disability and would have TCU coverage under the VA. She asks for the history and Physical and D/C summary to be faxed upon discharge. She states Daria at [phone] must next be contacted to confirm eligibility and to provide a current list of VA contracted SNFs. A message was left for Daria.  P) Will send new referrals to VA facilities once  confirmation received.[RH1.1]    Addendum  Son, Eliseo asked to meet with GEETA. Updated him of the process as noted above. He asks that a referral be sent to [facility], as this is believed to be a VA contracted SNF. Patient was there one year ago, he reports.  Referral sent via the DOD process.[RH1.2]    Update: [facility] can accept patient on 12/12.[RH1.3]     Revision History        User Key Date/Time User Provider Type Action    > RH1.3 12/11/2017  2:52 PM Yuri Silva  Addend     RH1.2 12/11/2017  2:19 PM Yuri Silva  Addend     RH1.1 12/11/2017 12:11 PM Silva, Yuri  Sign            Progress Notes by Tracy Newsome at 12/10/2017  5:35 PM     Author:  Tracy Newsome Service:  (none)  Author Type:      Filed:  12/10/2017  5:36 PM Date of Service:  12/10/2017  5:35 PM Creation Time:  12/10/2017  5:35 PM    Status:  Signed :  Tracy Newsome ()         SPIRITUAL HEALTH SERVICES Progress Note  [facility]    D: SH made referral for pastoral visit with [facility] per request.     P: No plans to follow.     Tracy Newsome   Intern[SB1.1]     Revision History        User Key Date/Time User Provider Type Action    > SB1.1 12/10/2017  5:36 PM Tracy Newsome Sign            Progress Notes by Yuan Gross LSW at 12/10/2017 11:48 AM     Author:  Yuan Gross LSW Service:  Social Work Author Type:      Filed:  12/10/2017  4:58 PM Date of Service:  12/10/2017 11:48 AM Creation Time:  12/10/2017 11:48 AM    Status:  Addendum :  Yuan Gross LSW ()         SW  D: Per run the list this writer contacted both of pt.'s sons and left msgs and then both returned the call. It was decided to proceed with a TCU transport since it is unlikely that [facility] will be able to take pt with a lift. The TCU choices of [facility] and [facility] were provided. This writer will begin the referral process and plans to meet with family later today in pt.'s room.  P:  will follow to assist with d/c to TCU.[JF1.1]    Met with pt.'s family and pt.'s hospitalist to further discuss d/c and pt.'s son Lasha plans to call the [facility] tomorrow to see if they are able to authorize TCU stay since pt may not meet the three day Medicare rule to have the rehab benefit. This writer also talked about the possibility of a TCU stay being private pay, but this writer was unable to get ahold of admissions at [facility] to get the private pay details. Referral was made to [facility] as pt will likely be ready for d/c on 12/11 or 12/12.[JF1.2] Discussed both TCU and the possibility of pt needing LTC if not now in the future. Pt.'s family is very realistic  and helpful.[JF1.3]   will continue to follow and update pt/family as appropriate regarding a TCU bed and d/c.[JF1.2]      Revision History        User Key Date/Time User Provider Type Action    > JF1.3 12/10/2017  4:58 PM Yuan Gross LSW  Addend     JF1.2 12/10/2017  4:55 PM Yuan Gross LSW  Addend     JF1.1 12/10/2017 11:51 AM Yuan Gross LSW  Sign            Progress Notes by Vesna Ríos MD at 12/10/2017  4:08 PM     Author:  Vesna Ríos MD Service:  Hospitalist Author Type:  Physician    Filed:  12/10/2017  4:16 PM Date of Service:  12/10/2017  4:08 PM Creation Time:  12/10/2017  4:08 PM    Status:  Signed :  Vesna Ríos MD (Physician)         [facility]    Hospitalist Progress Note    Assessment & Plan   Olaf Fierro is a 89 year old male who was admitted on 12/8/2017. He presented with a past medical history significant for arthritis, benign prostatic hypertrophy, cerebral infarction x2 with paroxsymal atrial fibrillation, CKD, GERD, hypertension and insomnia.  He was brought to the ED because he has having worsening encephalopathy.  He had several prior CVAs and there is concern this is another CVA.  The patient is able to answer basic questions but is often not felt to be fully involved in the conversation which per his family is a new change this week.  He was last seen by family normal on Wednesday, his birthday where he turned 89yo. Of note, he had been able to be mobile with a motorized scooter.  He lives in an assisted living for memory impairment but they are not skilled nursing.  His family is concerned that it has become too difficult for him to transfer safely or care for himself safely.     Today:    Urine culture positive for 10,000 to 50,000 colonies/mL, Coagulase negative Staphylococcus and Susceptibility testing in progress.  We will continue IV antibiotics.  His mental  status is much improved today and he is very aware of his surroundings.      Plan:  Encephalopathy/Altered mental status- resolving  - Ddx includes illness (Coag negative Staph UTI) vs stroke  - Continue IV Ceftriaxone   - For stroke work up neuro was consulted and options were discussed with the family.  Given the patients wishes he would not want a CT or MRI at this point to further evaluate if this is the new CVA.  No HCT was done at this time.   - Labs daily, antibiotics    Coagulase Negative Straph UTI  - Continue with Ceftriaxone IV until susceptibility testing done  - AMS improved     Increased weakness in Left upper extremity  - Concern possibly secondary to new TIA/CVA  - PT/OT consulted and recommend increased services from what he is currently receiving at [facility]    Cerebral infarction x2, concern for new CVA/TIA  - Weakness from prior CVAs  - Discussed with family TPA is not an option at this time.    - Family discussed they did not want an MRI at this time and this was discussed with Neuro.  The family understands that this is likely another stroke.   - Family is most concerned with PT and OT evaluation for possible change in placement.      Arthritis  - Leading to stiffness especially in lower extremities  - Defer to PT/OT eval     Benign prostatic hypertrophy  - Continue home medications     CKD (Baseline 1-1.2)  - Follow Cr and dose medications renally     GERD  - Continue omprazole     Hypertension   - Goal SBP <180  - Continue lisinopril and metoprolol     Insomnia  - Takes tylenol PM at home     DVT Prophylaxis: Pneumatic Compression Devices  Code Status: DNR / DNI     Disposition: Expected discharge once symptoms are addressed and pending on resolution if he will need assistance with more advanced care placement.     Vesna Ríos MD, PhD    Text Page       -Data reviewed today: I reviewed all new labs and imaging results over the last 24 hours. I personally reviewed no images or EKG's  today.    Physical Exam   Temp: 97.4  F (36.3  C) Temp src: Oral BP: 126/59 Pulse: 75 Heart Rate: 65 Resp: 16 SpO2: 99 % O2 Device: None (Room air)    Vitals:    12/08/17 1555   Weight: 79.4 kg (175 lb)     Vital Signs with Ranges  Temp:  [97.4  F (36.3  C)-98.5  F (36.9  C)] 97.4  F (36.3  C)  Pulse:  [71-79] 75  Heart Rate:  [65-77] 65  Resp:  [16] 16  BP: (100-126)/(38-59) 126/59  SpO2:  [94 %-99 %] 99 %  I/O last 3 completed shifts:  In: 280 [P.O.:280]  Out: 850 [Urine:850]    Constitutional: Awake, cooperative, no apparent distress. Moderate to severe dementia noted.  Eyes: Lids and lashes normal, sclera clear, conjunctiva normal.  ENT: Normocephalic, without obvious abnormality, atraumatic, sinuses nontender on palpation.  Very hard of hearing.  Noted skin cancer prior treated on scalp.  Respiratory: No increased work of breathing, good air exchange, clear to auscultation bilaterally, no crackles or wheezing.  Cardiovascular: Normal apical impulse, regular rate and rhythm, normal S1 and S2, no S3 or S4, and no murmur noted.  GI: No scars, normal bowel sounds, soft, non-distended, non-tender, no masses palpated, no hepatosplenomegaly.  Lymph/Hematologic: No cervical lymphadenopathy   Skin: Bruising noted on extremities but denies falls, no overt bleeding, pale skin color, no redness, warmth, or swelling, no rashes  Musculoskeletal: There is no redness, warmth, or swelling of the joints.    Neurologic: Awake, alert, oriented to name.    Neuropsychiatric: Calm, normal eye contact, alert, normal affect, oriented to self, but not place or time.  Poor memory or past and recent event recollection, abnormal thought process c/w moderate to severe dementia.        Medications        lisinopril (PRINIVIL/ZESTRIL) tablet 10 mg  10 mg Oral Daily     sodium chloride (PF)  3 mL Intracatheter Q8H     acetaminophen (TYLENOL) tablet 1,000 mg  1,000 mg Oral TID     aspirin EC EC tablet 81 mg  81 mg Oral Daily     calcium-vitamin  D  2 tablet Oral BID     melatonin tablet 3 mg  3 mg Oral At Bedtime     metoprolol  25 mg Oral Daily     omeprazole (priLOSEC) CR capsule 40 mg  40 mg Oral BID     sertraline  25 mg Oral Daily     tamsulosin  0.4 mg Oral Daily     cholecalciferol  1,000 Units Oral Daily     cefTRIAXone  1 g Intravenous Q24H       Data     Recent Labs  Lab 12/10/17  1000 12/09/17  0913 12/08/17  1604   WBC 7.5 8.4 7.3   HGB 8.7* 8.4* 9.9*   MCV 88 87 88   * 159 189    134 134   POTASSIUM 3.6 4.1 4.8   CHLORIDE 108 104 101   CO2 21 21 25   BUN 27 24 23   CR 1.23 1.28* 1.19   ANIONGAP 10 9 8   GIFTY 8.2* 7.9* 8.6   * 108* 120*   ALBUMIN  --  3.0*  --    PROTTOTAL  --  6.5*  --    BILITOTAL  --  0.3  --    ALKPHOS  --  69  --    ALT  --  20  --    AST  --  73*  --        Imaging:  No results found for this or any previous visit (from the past 24 hour(s)).[DP1.1]     Revision History        User Key Date/Time User Provider Type Action    > DP1.1 12/10/2017  4:16 PM Vesna Ríos MD Physician Sign            Progress Notes by Vesna Ríos MD at 12/9/2017  8:43 AM     Author:  Vesna Ríos MD Service:  Hospitalist Author Type:  Physician    Filed:  12/9/2017  3:06 PM Date of Service:  12/9/2017  8:43 AM Creation Time:  12/9/2017  8:43 AM    Status:  Signed :  Vesna Ríos MD (Physician)         [facility]    Hospitalist Progress Note    Assessment & Plan   Olaf Fierro is a 89 year old male who was admitted on 12/8/2017. He presents with a past medical history significant for arthritis, benign prostatic hypertrophy, cerebral infarction, CKD, GERD, hypertension and insomnia.  He was brought to the ED because he has having worsening encephalopathy.  He had several prior CVAs and there is concern this is a repeat.  The patient is able to answer basic questions but is often not felt to be fully involved in the conversation which per his family is a new change this week.  He was  last seen by family normal on Wednesday, his birthday where he turned 89yo. Of note, he had been able to be mobile with a motorized scooter.  He lives in an assisted living for memory impairment but they are not skilled nursing.  His family is concerned that it has become too difficult for him to transfer safely or care for himself safely.     What I did today:    Urine culture still pending, continue antibiotics prophy.  Maikel consulted.  Repeat labs.      Plan:  Encephalopathy/Altered mental status  - Ddx includes illness (UTI) vs metabolic vs stroke  - UA was positive, started Ceftriaxone and sent culture in ED  - Discussed options with the family and given the patients wishes he would not want a CT or MRI at this point to further evaluate if this is the new CVA.  No HCT was done at this time.  It was recommended that neurology be consulted.  The family understands that any further imaging will just be diagnositic and not therapeutic as the window for treatment is narrow.    - Labs daily, antibiotics  - If 2/2 infection may improve with antibiotics     Increased weakness in Left upper extremity  - Concern possibly 3rd stroke  - Discussed if it is an infection this could cause recrudesce of his prior strokes.   - PT/OT consulted     Arthritis  - Leading to stiffness especially in lower extremities  - Defer to PT/OT eval     Benign prostatic hypertrophy  - Continue home medications  - Able to urinate in the ED without much difficulty     Cerebral infarction x2  - Weakness and altered state could be from a new stroke or recrudesce of the old 2/2 an infection  - Neuro consulted for options.  Discussed with family TPA is not an option at this time.  Imaging was held given the family felt the patient would not want any further testing however they was some question if a UTI is not present would imaging be helpful.[DP1.1]   - Family discussed they did not want an MRI at this time and this was discussed with Neuro.  The  family understands that this is likely another stroke.   - Family is most concerned with PT and OT evaluation for possible change in placement.[DP1.2]      CKD (Baseline 1-1.2)  - Follow Cr and dose medications renally     GERD  - Has some nausea which is treated with Zofran  - Continue omprazole     Hypertension   - Goal SBP <180  - Continue lisinopril and metoprolol     Insomnia  - Takes tylenol PM at home  - Will hold off on other sedation medications      DVT Prophylaxis: Pneumatic Compression Devices  Code Status: DNR / DNI     Disposition: Expected discharge once symptoms are addressed and pending on resolution if he will need assistance with more advanced care placement.     Vesna Ríos MD, PhD    Text Page       -Data reviewed today: I reviewed all new labs and imaging results over the last 24 hours. I personally reviewed no images or EKG's today.    Physical Exam   Temp: 98.7  F (37.1  C) Temp src: Oral BP: 129/59 Pulse: 117 Heart Rate: 117 Resp: 16 SpO2: 93 % O2 Device: None (Room air)    Vitals:    12/08/17 1555   Weight: 79.4 kg (175 lb)     Vital Signs with Ranges  Temp:  [98.3  F (36.8  C)-99.1  F (37.3  C)] 98.7  F (37.1  C)  Pulse:  [113-117] 117  Heart Rate:  [] 117  Resp:  [16] 16  BP: (129-189)/() 129/59  SpO2:  [91 %-97 %] 93 %  I/O last 3 completed shifts:  In: 937 [I.V.:937]  Out: 500 [Urine:500]    Constitutional: Awake,[DP1.1] c[DP1.2]ooperative, no apparent distress[DP1.1].[DP1.2] Moderate to severe dementia noted.  Eyes: Lids and lashes normal, sclera clear, conjunctiva normal.  ENT: Normocephalic, without obvious abnormality, atraumatic, sinuses nontender on palpation[DP1.1].  Very hard of hearing.[DP1.2]  Noted skin cancer prior treated on scalp.  Respiratory: No increased work of breathing, good air exchange, clear to auscultation bilaterally, no crackles or wheezing.  Cardiovascular: Normal apical impulse, regular rate and rhythm, normal S1 and S2, no S3 or S4, and no  murmur noted.  GI: No scars, normal bowel sounds, soft, non-distended, non-tender, no masses palpated, no hepatosplenomegaly.  Lymph/Hematologic: No cervical lymphadenopathy   Skin: Bruising noted on extremities but denies falls, no overt bleeding, pale skin color, no redness, warmth, or swelling, no rashes  Musculoskeletal: There is no redness, warmth, or swelling of the joints.    Neurologic: Awake, alert, oriented to name.    Neuropsychiatric: Calm, normal eye contact, alert, normal affect, oriented to self, but not place or time.  Poor memory or past and recent event recollection, abnormal thought process c/w moderate to severe dementia.        Medications     NaCl 125 mL/hr at 12/09/17 0639       sodium chloride (PF)  3 mL Intracatheter Q8H     acetaminophen (TYLENOL) tablet 1,000 mg  1,000 mg Oral TID     aspirin EC EC tablet 81 mg  81 mg Oral Daily     calcium-vitamin D  2 tablet Oral BID     lisinopril (PRINIVIL/ZESTRIL) tablet 20 mg  20 mg Oral Daily     melatonin tablet 3 mg  3 mg Oral At Bedtime     metoprolol  25 mg Oral Daily     omeprazole (priLOSEC) CR capsule 40 mg  40 mg Oral BID     sertraline  25 mg Oral Daily     tamsulosin  0.4 mg Oral Daily     cholecalciferol  1,000 Units Oral Daily     cefTRIAXone  1 g Intravenous Q24H       Data     Recent Labs  Lab 12/08/17  1604   WBC 7.3   HGB 9.9*   MCV 88         POTASSIUM 4.8   CHLORIDE 101   CO2 25   BUN 23   CR 1.19   ANIONGAP 8   GIFTY 8.6   *       Imaging:  No results found for this or any previous visit (from the past 24 hour(s)).[DP1.1]     Revision History        User Key Date/Time User Provider Type Action    > DP1.2 12/9/2017  3:06 PM Vesna Ríos MD Physician Sign     DP1.1 12/9/2017  8:43 AM Vesna Ríos MD Physician             Progress Notes by Mildred Lopez PT at 12/9/2017  2:52 PM     Author:  Mildred Lopez PT Service:  (none) Author Type:  Physical Therapist    Filed:  12/9/2017  2:53 PM Date of Service:   12/9/2017  2:52 PM Creation Time:  12/9/2017  2:52 PM    Status:  Signed :  Mildred Lopez PT (Physical Therapist)            12/09/17 1445   Quick Adds   Type of Visit Initial PT Evaluation   Living Environment   Lives With alone   Living Arrangements assisted living  ([facility])   Home Accessibility no concerns   Living Environment Comment Pt lives at [facility], receives assist with bed mobility and all ADLs, per facility staff and family, pt is able to transfer IND from bed <> motorized w/c <> lift chair   Self-Care   Usual Activity Tolerance fair   Current Activity Tolerance poor   Equipment Currently Used at Home wheelchair, power   Functional Level Prior   Ambulation 1-->assistive equipment  (able to operate motorized chair for mobility)   Transferring 0-->independent   Toileting 3-->assistive equipment and person   Bathing 3-->assistive equipment and person   Dressing 2-->assistive person   Eating 0-->independent   Fall history within last six months yes   Number of times patient has fallen within last six months 1   Which of the above functional risks had a recent onset or change? transferring   Prior Functional Level Comment Per family and facility staff, pt requires A for bed mobility, IND with transfers from bed <> motorized w/c, does not ambulate, receives assist with all ADLs   General Information   Onset of Illness/Injury or Date of Surgery - Date 12/08/17   Referring Physician Vesna Ríos MD   Patient/Family Goals Statement to have increased help for pt   Pertinent History of Current Problem (include personal factors and/or comorbidities that impact the POC) Pt is a 89 year old male who presents with new altered mental status worsening 12/8 AM.  The facility in where the patient lives called the family to discuss the new altered changes.   Precautions/Limitations fall precautions   General Info Comments Activity: Ambulate with assist   Cognitive Status Examination   Orientation  "person;place   Level of Consciousness confused   Follows Commands and Answers Questions 100% of the time   Personal Safety and Judgment impaired   Memory impaired   Cognitive Comment Per family and facility staff, pt is usually fully oriented   Pain Assessment   Patient Currently in Pain (c/o pain in R knee, does not rate 0-10)   Posture    Posture Forward head position;Protracted shoulders;Kyphosis   Range of Motion (ROM)   ROM Comment Due to pain, did not want to move BLE much, appears may have B knee flexion contractures, did not observe pt fully extending either leg throughout session   Strength   Strength Comments unable to fully assess as pt not wanting to move BLEs (?pain)   Bed Mobility   Bed Mobility Comments min A for supine > sit with HOB slightly elevated   Transfer Skills   Transfer Comments attempted sit <> stand with max A x2 with FWW, able to unweight bottom off bed but unable to fully stand to attempt SPT    Gait   Gait Comments did not assess, non ambulatory at baseline   Balance   Balance Comments Good static sitting balance, unable to assess standing balance 2/2 inability to fully stand   Sensory Examination   Sensory Perception Comments denies N/T   Clinical Impression   Criteria for Skilled Therapeutic Intervention evaluation only   PT Diagnosis decreased functional mobility   Influenced by the following impairments weakness, cognition, pain in R knee   Functional limitations due to impairments bed mobility, transfers   Clinical Presentation Stable/Uncomplicated   Clinical Presentation Rationale stable clinical picture this date   Clinical Decision Making (Complexity) Low complexity   Predicted Duration of Therapy Intervention (days/wks) Eval Only   Anticipated Discharge Disposition Transitional Care Facility;Home with Home Therapy   Risk & Benefits of therapy have been explained Yes   Patient, Family & other staff in agreement with plan of care Yes   [facility] AM-PAC TM \"6 Clicks\"   " "2016, Trustees of [facility], under license to [employer].  All rights reserved.   6 Clicks Short Forms Basic Mobility Inpatient Short Form   [facility] AM-PAC  \"6 Clicks\" V.2 Basic Mobility Inpatient Short Form   1. Turning from your back to your side while in a flat bed without using bedrails? 3 - A Little   2. Moving from lying on your back to sitting on the side of a flat bed without using bedrails? 3 - A Little   3. Moving to and from a bed to a chair (including a wheelchair)? 1 - Total   4. Standing up from a chair using your arms (e.g., wheelchair, or bedside chair)? 1 - Total   5. To walk in hospital room? 1 - Total   6. Climbing 3-5 steps with a railing? 1 - Total   Basic Mobility Raw Score (Score out of 24.Lower scores equate to lower levels of function) 10   Total Evaluation Time   Total Evaluation Time (Minutes) 22[RF1.1]        Revision History        User Key Date/Time User Provider Type Action    > RF1.1 12/9/2017  2:53 PM Mildred Lopez, PT Physical Therapist Sign            Progress Notes by Teri Lucas SLP at 12/9/2017  1:21 PM     Author:  Teri Lucas SLP Service:  (none) Author Type:  Speech Language    Filed:  12/9/2017  1:21 PM Date of Service:  12/9/2017  1:21 PM Creation Time:  12/9/2017  1:21 PM    Status:  Signed :  Teri Lucas SLP (Speech Language)            12/09/17 1252   General Information   Onset Date 12/08/17   Start of Care Date 12/09/17   Referring Physician Dr. Ríos   Patient Profile Review/OT: Additional Occupational Profile Info See Profile for full history and prior level of function   Patient/Family Goals Statement Patient not able to state.   Swallowing Evaluation Bedside swallow evaluation   Behaviorial Observations Lethargic   Mode of current nutrition Oral diet   Type of oral diet (Clear liquids)   Respiratory Status Room air   Comments Olaf Fierro is a 89 year old male who presents with a past medical history " significant for arthritis, benign prostatic hypertrophy, cerebral infarction, CKD, GERD, hypertension and insomnia.  He was brought in today because he has worsening encephalopathy.  He had several prior CVA and there is concern this is a repeat.  The patient is able to answer basic questions but is often not felt to be fully involved in the conversation which per his family is a new change this week.  He was last seen by family normal on Wednesday, his birthday where he turned 89yo.  Nurse reports immediate coughing with thin liquids.    Clinical Swallow Evaluation   Oral Musculature unable to assess due to poor participation/comprehension   Structural Abnormalities none present   Dentition present and adequate   Swallow Eval   Feeding Assistance frequent cues/help required   Clinical Swallow Eval: Thin Liquid Texture Trial   Mode of Presentation, Thin Liquids cup;self-fed   Volume of Liquid or Food Presented 4 swallows   Oral Phase of Swallow Premature pharyngeal entry   Pharyngeal Phase of Swallow impaired;reduction in laryngeal movement;repeated swallows;coughing/choking   Diagnostic Statement Delayed cough suspect penetration ot silent aspiration.    Clinical Swallow Eval: Nectar Thick Liquid Texture Trial   Mode of Presentation, Nectar cup;self-fed   Volume of Nectar Presented 3 swallows   Oral Phase, Nectar Premature pharyngeal entry   Pharyngeal Phase, Nectar impaired;reduction in laryngeal movement;repeated swallows   Diagnostic Statement No overt Sx of aspiration.    Clinical Swallow Eval: Puree Solid Texture Trial   Mode of Presentation, Puree spoon;fed by clinician   Volume of Puree Presented 2 teaspoons   Oral Phase, Puree Poor AP movement;Premature pharyngeal entry   Pharyngeal Phase, Puree impaired;reduction in laryngeal movement;repeated swallows   Diagnostic Statement Suspect pharyngeal residue. Burping noted, has a history of GERDS.    Swallow Compensations   Swallow Compensations Alternate  viscosity of consistencies;Pacing;Reduce amounts;Multiple swallow   Results Suspect silent aspiration;Oral difficulties only   Esophageal Phase of Swallow   Patient reports or presents with symptoms of esophageal dysphagia Yes   Esophageal comments GERDS   General Therapy Interventions   Planned Therapy Interventions Dysphagia Treatment   Dysphagia treatment Modified diet education   Swallow Eval: Clinical Impressions   Skilled Criteria for Therapy Intervention Skilled criteria met.  Treatment indicated.   Functional Assessment Scale (FAS) 3   Treatment Diagnosis Moderate oral and pharyngeal dysphagia   Diet texture recommendations Dysphagia diet level 1;Nectar thick liquids   Recommended Feeding/Eating Techniques alternate between small bites and sips of food/liquid;check mouth frequently for oral residue/pocketing;hard swallow w/ each bite or sip;maintain upright posture during/after eating for 30 mins;no straws;small sips/bites   Therapy Frequency 3 times/wk   Predicted Duration of Therapy Intervention (days/wks) 1 week   Anticipated Discharge Disposition extended care facility   Risks and Benefits of Treatment have been explained. Yes   Patient, family and/or staff in agreement with Plan of Care Yes   Clinical Impression Comments Patient presents with moderate oral and pharyngeal dysphagia at bedside. Patient was lethargic throughout the evaluation and only took a limited amount of PO intake. His nurse reports coughing with thin liquids at bedside. He demonstrated premature entry of thin liquids with delayed cough noted. Suspec penetration or silent aspiration. Nectar thick liquids were tolerated without overt Sx of aspiration on a small amount consumed. He only took 2 teaspoon amounts of apple sauce with no overt Sx of aspiration, but burping noted after. Patient at risk for aspiration givne his history fo CVA's, generalized weakness and JENNIFER. Recommend: 1. Dysphagia Diet Level 1 with nectar thick liquid. 2. Up  "in the chair for all meals and 30 -60 minutes after. No straws, small sips/bites, alternate liquids/solids every couple of bites.    Total Evaluation Time   Total Evaluation Time (Minutes) 18[SM1.1]        Revision History        User Key Date/Time User Provider Type Action    > SM1.1 12/9/2017  1:21 PM Teri Lucas, SLP Speech Language Sign            ED Provider Notes by Lashay Marino MD at 12/8/2017  3:47 PM     Author:  Lashay Marino MD Service:  (none) Author Type:  Physician    Filed:  12/9/2017 12:29 AM Date of Service:  12/8/2017  3:47 PM Creation Time:  12/8/2017  4:18 PM    Status:  Signed :  Lashay Marino MD (Physician)           History     Chief Complaint:  Extremity Weakness (Pt having difficulties with L arm; staff states that symptoms started around 7am and resolved by 730am.  EMS was not called because they \"thought it was a TIA\" per EMS.)      HPI   Olaf Fierro is a 89 year old male who presents with[RB1.1] intermittent left arm weakness today. For 30 minutes this morning from 7 to 7:30 the staff noticed some weakness. They did not mention facial droop or difficulty speaking. The patient has had a stroke in the past. He was doing well during the day but then when his son saw him, he felt that he had trouble lifting up a glass and had weakness. Not sure exactly if this is been intermittent during the day or continuous. No headache, no falls recently. He gets around in a motor scooter or a wheelchair but does not ambulate. His speech was somewhat garbled when his son came to the nursing home this afternoon and he was more confused than normal. He has not been ill recently with fevers or chills but has had UTIs in the past and has been going more frequently. His son thought that he had a little bit of a facial droop on the left now in his left arm was weaker. No other associated signs or symptoms.[TP1.1]    Allergies:  No Known Allergies     Medications:    oxyCODONE " "(ROXICODONE)   sertraline (ZOLOFT)   metoprolol (LOPRESSOR)   AMOXICILLIN PO  ASPIRIN EC PO  lidocaine (XYLOCAINE) 5 % ointment  LISINOPRIL PO  MENTHOL-METHYL SALICYLATE EX  tamsulosin (FLOMAX)   psyllium (METAMUCIL/KONSYL)   sennosides (SENOKOT)     Past Medical History:    Arthritis   Benign prostatic hypertrophy   Cerebral infarction (H)   CKD (chronic kidney disease)   GERD (gastroesophageal reflux disease)   Hypertension   Insomnia     Past Surgical History:[RB1.1]    CATARACT IOL, RT/LT    right shoulder arthroplasty  left knee surgery[RB1.2]    Family History:    No family history on file.    Social History:[RB1.1]  The patient denies tobacco or alcohol use.[RB1.2]  Marital Status:   [2]     Review of Systems[RB1.1]   Unable to perform ROS[TP1.1]:[RB1.1] Dementia[TP1.1]   Constitutional: Negative for[RB1.1] fever[TP1.1].   Respiratory: Negative for[RB1.1] shortness of breath[TP1.1].    Cardiovascular: Negative for[RB1.1] chest pain[TP1.1].   Gastrointestinal: Negative for[RB1.1] abdominal pain[TP1.1].   Neurological: Negative for[RB1.1] dizziness[TP1.1].         Physical Exam   First Vitals:[RB1.1]  BP: 188/73  Pulse: 113  Heart Rate: 87  Temp: 98.7  F (37.1  C)  Resp: 16  Height: 167.6 cm (5' 6\")  Weight: 79.4 kg (175 lb)  SpO2: 97 %[RB1.3]      Physical Exam[RB1.1]   I reviewed the vital signs and nursing notes.  General: Patient is awake and alert, hard of hearing. Seems restless.  HEENT: Some left flattening of the nasolabial fold. Speech is a little slurred. No nasal discharge. Mucous membranes are moist. No tongue deviation.  Eyes: Pupils are equal and reactive, conjugate gaze is noted.  Pulmonary: Lungs are clear to auscultation, no retractions or wheezing.  Cardiovascular: Irregular pulse but normal rate, good peripheral perfusion with capillary refill less than 2 seconds. No JVD.  Musculoskeletal: 1+ lower extremity edema noted bilaterally, moves all extremities but weakness in the left arm " and hand noted. Legs are diffusely weak.  Skin: Not diaphoretic, no rash, no obvious bruises.  Neuro: Left hand  weakness, hard time following commands at times. Speech is slightly slurred and there seems to be a little bit of a left facial droop. He does have some chronic left visual field defect from previous stroke. Leg strength is bilaterally weak. He has dementia. He answers many questions appropriately. Sensation seems to be intact and there is no obvious neglect. Very difficult to do an NIH score on this patient because of his difficulty in following commands.  Psych: Pleasantly demented, some agitation noted. No obvious delusions or hallucinations.  Abdomen: Nontender, no organomegaly. No pulsatile mass.[TP1.1]    Emergency Department Course   ECG:[RB1.1]  Indication: extremity weakness  Time: 1600  Vent. Rate 91 bpm. TX interval 224. QRS duration 66. QT/QTc 322/396. P-R-T axis 79 56 56. Sinus rhythm with 1st degree AV block. Otherwise normal ECG. No significant change compared to EKG dated 02/08/2016.     Read time: 1605[RB1.2]    Laboratory:[RB1.1]  UA[RB1.4] reflex to[RB1.5] micro[RB1.4] and culture: pH Urine 8.0(H), Leukocyte Esterase Urine - Trace (A)[RB1.5] o/w negative    UA micro:[RB1.4] WBC/HPF 5-10 (A), Bacteria - Few (A),[RB1.5] o/w negative[RB1.4]    Urine Culture Aerobic bacterial (In Process)    CBC: WBC: 7.3, HGB: 9.9(L), PLT: 189    BMP: Glucose 120 (H), GFR Estimate 57 (L), o/w WNL (Creatinine: 1.19)[RB1.5]      Interventions:[RB1.1]  2127 Sodium chloride infusion IV  1902 Zofran 4 mg IV  2043 Rocephin 1 g in NS IV   2057 Zofran 4 mg IV[RB1.5]    Emergency Department Course:[RB1.1]  Nursing notes and vitals reviewed.     1610 I performed an exam of the patient as documented above.     IV inserted. Medicine administered as documented above.     Blood drawn. This was sent to the lab for further testing, results above.[RB1.2]    The patient provided a urine sample here in the emergency  department. This was sent for laboratory testing, findings above.[RB1.5]     EKG obtained in the ED, see results above.[RB1.2]     The patient provided a urine sample here in the emergency department. This was sent for laboratory testing, findings above.     1831[RB1.4] I rechecked the patient and discussed the results of his workup thus far.[RB1.2]     1900[RB1.4] I rechecked the patient and discussed the results of his workup thus far.[RB1.2]     1915 I consulted with Dr. Ríos, hospitalist, regarding the patient's history and presentation here in the emergency department.[RB1.6]    1919  I consulted with Dr. Ríos of the hospitalist services. They are in agreement to accept the patient for admission.    Findings and plan explained to the Patient who consents to admission. Discussed the patient with Dr. Ríos, who will admit the patient to a neuro OBS bed for further monitoring, evaluation, and treatment.[RB1.5]        Impression & Plan      Medical Decision Making:[RB1.1]  The patient comes in with possible stroke again. He is 89 years old, is DNR/DNI. He had a small stroke in the past year that did not show up on MRI. It appears he has some left-sided weakness which may be new but the son isn't sure if he had any weakness prior. His blood work came back with stable chronic renal failure, normal CBC. His urine was a catheterized specimen and did have some white cells and bacteria but nitrite was negative and it was clear. I've ordered a culture. It's possible that he does have a UTI so I will start Rocephin IV. After discussing with the son that he is not a candidate for any intervention for his stroke, the son did not want imaging but states he can't go back to his assisted living memory care and will need placement in an TCU or nursing home. He had some nausea while he was here but no headache. He was given Zofran for that and had a couple episodes of emesis. He starting to become more agitated at  night because of his sundowners and will be given appropriate medication after he arrives on the floor and the hospitalist has a chance to see him. I discussed the case with the hospitalist who will admit the patient on a neuro obs bed and consideration of placement tomorrow.  Neurology can be consulted in the morning as well. If he develops a headache and continued vomiting, a CT scan of his head could be obtained to make sure he doesn't have a bleed, but he would not be a candidate for surgery anyway. Son wants supportive cares.[TP1.1]      Diagnosis:[RB1.1]    ICD-10-CM    1. Left-sided weakness R53.1 Urine Culture Aerobic Bacterial   2. Prerenal azotemia R79.89    3. Dehydration E86.0     Mild   4. Dementia without behavioral disturbance, unspecified dementia type F03.90    5. Anemia, unspecified type D64.9[RB1.3]        Disposition:[RB1.1]  Admitted to neuro OBS.[RB1.5]  Neurology consultation the morning, social service consult for probable placement.[TP1.1]    Discharge Medications:    Cristian SALVADOR am serving as a scribe on 12/8/2017 at 4:10 PM to personally document services performed by Lashay Marino MD based on my observations and the provider's statements to me.       Cristian Spring  12/8/2017    EMERGENCY DEPARTMENT[RB1.1]       Lashay Marino MD  12/09/17 0029  [TP1.2]     Revision History        User Key Date/Time User Provider Type Action    > TP1.2 12/9/2017 12:29 AM Lashay Marino MD Physician Sign     RB1.3 12/8/2017 10:38 PM Cristian Spring Scribe Share     RB1.5 12/8/2017 10:13 PM Cristian Spring Scribe      TP1.1 12/8/2017  9:04 PM Lashay Marino MD Physician Share     RB1.6 12/8/2017  7:17 PM Cristian Spring Scribe Share     RB1.4 12/8/2017  7:01 PM Cristian Springibe Share     RB1.2 12/8/2017  5:17 PM Cristian Springibselena Share     RB1.1 12/8/2017  5:10 PM Cristian Spring                  Procedure Notes     No notes of this type exist for this  encounter.         Progress Notes - Therapies (Notes from 12/09/17 through 12/12/17)      Progress Notes by Mildred Lopez PT at 12/9/2017  2:52 PM     Author:  Mildred Lopez PT Service:  (none) Author Type:  Physical Therapist    Filed:  12/9/2017  2:53 PM Date of Service:  12/9/2017  2:52 PM Creation Time:  12/9/2017  2:52 PM    Status:  Signed :  Mildred Lopez PT (Physical Therapist)            12/09/17 1445   Quick Adds   Type of Visit Initial PT Evaluation   Living Environment   Lives With alone   Living Arrangements assisted living  ([facility] Accessibility no concerns   Living Environment Comment Pt lives at [facility], receives assist with bed mobility and all ADLs, per facility staff and family, pt is able to transfer IND from bed <> motorized w/c <> lift chair   Self-Care   Usual Activity Tolerance fair   Current Activity Tolerance poor   Equipment Currently Used at Home wheelchair, power   Functional Level Prior   Ambulation 1-->assistive equipment  (able to operate motorized chair for mobility)   Transferring 0-->independent   Toileting 3-->assistive equipment and person   Bathing 3-->assistive equipment and person   Dressing 2-->assistive person   Eating 0-->independent   Fall history within last six months yes   Number of times patient has fallen within last six months 1   Which of the above functional risks had a recent onset or change? transferring   Prior Functional Level Comment Per family and facility staff, pt requires A for bed mobility, IND with transfers from bed <> motorized w/c, does not ambulate, receives assist with all ADLs   General Information   Onset of Illness/Injury or Date of Surgery - Date 12/08/17   Referring Physician Vesna Ríos MD   Patient/Family Goals Statement to have increased help for pt   Pertinent History of Current Problem (include personal factors and/or comorbidities that impact the POC) Pt is a 89 year old male who presents with new  altered mental status worsening 12/8 AM.  The facility in where the patient lives called the family to discuss the new altered changes.   Precautions/Limitations fall precautions   General Info Comments Activity: Ambulate with assist   Cognitive Status Examination   Orientation person;place   Level of Consciousness confused   Follows Commands and Answers Questions 100% of the time   Personal Safety and Judgment impaired   Memory impaired   Cognitive Comment Per family and facility staff, pt is usually fully oriented   Pain Assessment   Patient Currently in Pain (c/o pain in R knee, does not rate 0-10)   Posture    Posture Forward head position;Protracted shoulders;Kyphosis   Range of Motion (ROM)   ROM Comment Due to pain, did not want to move BLE much, appears may have B knee flexion contractures, did not observe pt fully extending either leg throughout session   Strength   Strength Comments unable to fully assess as pt not wanting to move BLEs (?pain)   Bed Mobility   Bed Mobility Comments min A for supine > sit with HOB slightly elevated   Transfer Skills   Transfer Comments attempted sit <> stand with max A x2 with FWW, able to unweight bottom off bed but unable to fully stand to attempt SPT    Gait   Gait Comments did not assess, non ambulatory at baseline   Balance   Balance Comments Good static sitting balance, unable to assess standing balance 2/2 inability to fully stand   Sensory Examination   Sensory Perception Comments denies N/T   Clinical Impression   Criteria for Skilled Therapeutic Intervention evaluation only   PT Diagnosis decreased functional mobility   Influenced by the following impairments weakness, cognition, pain in R knee   Functional limitations due to impairments bed mobility, transfers   Clinical Presentation Stable/Uncomplicated   Clinical Presentation Rationale stable clinical picture this date   Clinical Decision Making (Complexity) Low complexity   Predicted Duration of Therapy  "Intervention (days/wks) Eval Only   Anticipated Discharge Disposition Transitional Care Facility;Home with Home Therapy   Risk & Benefits of therapy have been explained Yes   Patient, Family & other staff in agreement with plan of care Yes   [facility] TM \"6 Clicks\"   2016, Trustees of [facility], under license to [employer].  All rights reserved.   6 Clicks Short Forms Basic Mobility Inpatient Short Form   [facility]  \"6 Clicks\" V.2 Basic Mobility Inpatient Short Form   1. Turning from your back to your side while in a flat bed without using bedrails? 3 - A Little   2. Moving from lying on your back to sitting on the side of a flat bed without using bedrails? 3 - A Little   3. Moving to and from a bed to a chair (including a wheelchair)? 1 - Total   4. Standing up from a chair using your arms (e.g., wheelchair, or bedside chair)? 1 - Total   5. To walk in hospital room? 1 - Total   6. Climbing 3-5 steps with a railing? 1 - Total   Basic Mobility Raw Score (Score out of 24.Lower scores equate to lower levels of function) 10   Total Evaluation Time   Total Evaluation Time (Minutes) 22[RF1.1]        Revision History        User Key Date/Time User Provider Type Action    > RF1.1 12/9/2017  2:53 PM Mildred Lopez, LAUREANO Physical Therapist Sign            Progress Notes by Teri Lucas SLP at 12/9/2017  1:21 PM     Author:  Teri Lucas SLP Service:  (none) Author Type:  Speech Language    Filed:  12/9/2017  1:21 PM Date of Service:  12/9/2017  1:21 PM Creation Time:  12/9/2017  1:21 PM    Status:  Signed :  Teri Lucas SLP (Speech Language)            12/09/17 1252   General Information   Onset Date 12/08/17   Start of Care Date 12/09/17   Referring Physician Dr. Ríos   Patient Profile Review/OT: Additional Occupational Profile Info See Profile for full history and prior level of function   Patient/Family Goals Statement Patient not able to state. "   Swallowing Evaluation Bedside swallow evaluation   Behaviorial Observations Lethargic   Mode of current nutrition Oral diet   Type of oral diet (Clear liquids)   Respiratory Status Room air   Comments Olaf Fierro is a 89 year old male who presents with a past medical history significant for arthritis, benign prostatic hypertrophy, cerebral infarction, CKD, GERD, hypertension and insomnia.  He was brought in today because he has worsening encephalopathy.  He had several prior CVA and there is concern this is a repeat.  The patient is able to answer basic questions but is often not felt to be fully involved in the conversation which per his family is a new change this week.  He was last seen by family normal on Wednesday, his birthday where he turned 89yo.  Nurse reports immediate coughing with thin liquids.    Clinical Swallow Evaluation   Oral Musculature unable to assess due to poor participation/comprehension   Structural Abnormalities none present   Dentition present and adequate   Swallow Eval   Feeding Assistance frequent cues/help required   Clinical Swallow Eval: Thin Liquid Texture Trial   Mode of Presentation, Thin Liquids cup;self-fed   Volume of Liquid or Food Presented 4 swallows   Oral Phase of Swallow Premature pharyngeal entry   Pharyngeal Phase of Swallow impaired;reduction in laryngeal movement;repeated swallows;coughing/choking   Diagnostic Statement Delayed cough suspect penetration ot silent aspiration.    Clinical Swallow Eval: Nectar Thick Liquid Texture Trial   Mode of Presentation, Nectar cup;self-fed   Volume of Nectar Presented 3 swallows   Oral Phase, Nectar Premature pharyngeal entry   Pharyngeal Phase, Nectar impaired;reduction in laryngeal movement;repeated swallows   Diagnostic Statement No overt Sx of aspiration.    Clinical Swallow Eval: Puree Solid Texture Trial   Mode of Presentation, Puree spoon;fed by clinician   Volume of Puree Presented 2 teaspoons   Oral Phase, Puree  Poor AP movement;Premature pharyngeal entry   Pharyngeal Phase, Puree impaired;reduction in laryngeal movement;repeated swallows   Diagnostic Statement Suspect pharyngeal residue. Burping noted, has a history of GERDS.    Swallow Compensations   Swallow Compensations Alternate viscosity of consistencies;Pacing;Reduce amounts;Multiple swallow   Results Suspect silent aspiration;Oral difficulties only   Esophageal Phase of Swallow   Patient reports or presents with symptoms of esophageal dysphagia Yes   Esophageal comments GERDS   General Therapy Interventions   Planned Therapy Interventions Dysphagia Treatment   Dysphagia treatment Modified diet education   Swallow Eval: Clinical Impressions   Skilled Criteria for Therapy Intervention Skilled criteria met.  Treatment indicated.   Functional Assessment Scale (FAS) 3   Treatment Diagnosis Moderate oral and pharyngeal dysphagia   Diet texture recommendations Dysphagia diet level 1;Nectar thick liquids   Recommended Feeding/Eating Techniques alternate between small bites and sips of food/liquid;check mouth frequently for oral residue/pocketing;hard swallow w/ each bite or sip;maintain upright posture during/after eating for 30 mins;no straws;small sips/bites   Therapy Frequency 3 times/wk   Predicted Duration of Therapy Intervention (days/wks) 1 week   Anticipated Discharge Disposition extended care facility   Risks and Benefits of Treatment have been explained. Yes   Patient, family and/or staff in agreement with Plan of Care Yes   Clinical Impression Comments Patient presents with moderate oral and pharyngeal dysphagia at bedside. Patient was lethargic throughout the evaluation and only took a limited amount of PO intake. His nurse reports coughing with thin liquids at bedside. He demonstrated premature entry of thin liquids with delayed cough noted. Suspec penetration or silent aspiration. Nectar thick liquids were tolerated without overt Sx of aspiration on a small  amount consumed. He only took 2 teaspoon amounts of apple sauce with no overt Sx of aspiration, but burping noted after. Patient at risk for aspiration givne his history fo CVA's, generalized weakness and JENNIFER. Recommend: 1. Dysphagia Diet Level 1 with nectar thick liquid. 2. Up in the chair for all meals and 30 -60 minutes after. No straws, small sips/bites, alternate liquids/solids every couple of bites.    Total Evaluation Time   Total Evaluation Time (Minutes) 18[SM1.1]        Revision History        User Key Date/Time User Provider Type Action    > SM1.1 12/9/2017  1:21 PM Teri Lucas, SLP Speech Language Sign

## 2017-12-08 NOTE — IP AVS SNAPSHOT
MRN:[id number]                      After Visit Summary   12/8/2017    Olaf Fierro    MRN: [id number]           Thank you!     Thank you for choosing [facility] for your care. Our goal is always to provide you with excellent care. Hearing back from our patients is one way we can continue to improve our services. Please take a few minutes to complete the written survey that you may receive in the mail after you visit with us. Thank you!        Patient Information     Date Of Birth          12/6/1920        Designated Caregiver       Most Recent Value    Caregiver    Will someone help with your care after discharge? yes    Name of designated caregiver Inpatient rehab    Phone number of caregiver inpatient rehab    Caregiver address Inpatient rehab      About your hospital stay     You were admitted on:  December 8, 2017 You last received care in the:  [facility] [address] [facility]    You were discharged on:  December 12, 2017        Reason for your hospital stay       Acute encephalopathy due to UTI                  Who to Call     For medical emergencies, please call 911.  For non-urgent questions about your medical care, please call your primary care provider or clinic, [phone]          Attending Provider     Provider Specialty    Lashay Marino MD Emergency Medicine    [facility], [facility]na YANCEY MD Internal Medicine       Primary Care Provider Office Phone # Fax #    Teddy Sahu [phone]      After Care Instructions     Activity - Up with nursing assistance           Advance Diet as Tolerated       Follow this diet upon discharge: Orders Placed This Encounter      Room Service      Combination Diet Dysphagia Diet Level 2: Mechan Altered; Thin Liquids (water, ice chips, juice, milk gelatin, ice cream, etc)              Fall precautions           General info for SNF       Length of Stay Estimate: Short Term Care: Estimated # of Days <30  Condition at  "Discharge: Improving  Level of care:skilled   Rehabilitation Potential: Good  Admission H&P remains valid and up-to-date: Yes  Recent Chemotherapy: N/A  Use Nursing Home Standing Orders: N/A            Mantoux instructions       Give two-step Mantoux (PPD) Per Facility Policy Yes                  Follow-up Appointments     Follow Up and recommended labs and tests       Follow up with Nursing home physician.                  Your next 10 appointments already scheduled     Dec 13, 2017  5:30 AM CST   Inpatient Meal Follow Up Therapy with Teri Lucas, SLP   [facility] Speech Therapy ([facility])    [address], [address]  [facility] [id number] [phone]              Additional Services     Occupational Therapy Adult Consult       Evaluate and treat as clinically indicated.    Reason:            Physical Therapy Adult Consult       Evaluate and treat as clinically indicated.    Reason:                  Further instructions from your care team       D/C to [facility], phone [phone]    Pending Results     No orders found from 12/6/2017 to 12/9/2017.            Statement of Approval     Ordered          12/12/17 1226  I have reviewed and agree with all the recommendations and orders detailed in this document.  EFFECTIVE NOW     Approved and electronically signed by:  Lee Gold MD             Admission Information     Date & Time Provider Department Dept. Phone    12/8/2017 Vesna Ríos MD [place] [address] [facility] [phone]      Your Vitals Were     Blood Pressure Pulse Temperature Respirations Height Weight    148/62 (BP Location: Left arm) 75 98  F (36.7  C) (Oral) 16 1.676 m (5' 6\") 79.4 kg (175 lb)    Pulse Oximetry BMI (Body Mass Index)                94% 28.25 kg/m2          MyChart Information     [employer] lets you send messages to your doctor, view your test results, renew your prescriptions, schedule appointments and more. To sign " "up, go to [email or web address] . Click on \"Log in\" on the left side of the screen, which will take you to the Welcome page. Then click on \"Sign up Now\" on the right side of the page.     You will be asked to enter the access code listed below, as well as some personal information. Please follow the directions to create your username and password.     Your access code is: [email or web address]  Expires: 3/12/2018  1:27 PM     Your access code will  in 90 days. If you need help or a new code, please call your [place] [facility] clinic or [phone].        Care EveryWhere ID     This is your Care EveryWhere ID. This could be used by other organizations to access your [place] [facility] medical records  [id number]        Equal Access to Services     RADHA HOLGUIN : Hussain Jeffers, kae queen, stacy guerra, fran hilton . [facility] [phone].    ATENCIÓN: Si habla español, tiene a villanueva disposición servicios gratuitos de asistencia lingüística. Allyson al [phone].    We comply with applicable federal civil rights laws and [place] laws. We do not discriminate on the basis of race, color, national origin, age, disability, sex, sexual orientation, or gender identity.               Review of your medicines      START taking        Dose / Directions    cephALEXin 500 MG capsule   Commonly known as:  KEFLEX   Used for:  Dysuria        Dose:  500 mg   Take 1 capsule (500 mg) by mouth 3 times daily for 5 days   Quantity:  15 capsule   Refills:  0         CONTINUE these medicines which may have CHANGED, or have new prescriptions. If we are uncertain of the size of tablets/capsules you have at home, strength may be listed as something that might have changed.        Dose / Directions    metoprolol 25 MG tablet   Commonly known as:  LOPRESSOR   This may have changed:  additional instructions   Used for:  Atrial fibrillation, unspecified        Dose:  25 mg   Take 1 tablet (25 mg) by " mouth daily   Quantity:  60 tablet   Refills:  0       sertraline 25 MG tablet   Commonly known as:  ZOLOFT   This may have changed:  additional instructions   Used for:  Depression        Dose:  25 mg   Take 1 tablet (25 mg) by mouth daily   Quantity:  30 tablet   Refills:  0       traMADol 50 MG tablet   Commonly known as:  ULTRAM   This may have changed:  reasons to take this   Used for:  Arthritis        Dose:  100 mg   Take 2 tablets (100 mg) by mouth 3 times daily as needed   Quantity:  30 tablet   Refills:  0         CONTINUE these medicines which have NOT CHANGED        Dose / Directions    ACETAMINOPHEN PO        Dose:  1000 mg   Take 1,000 mg by mouth 3 times daily   Refills:  0       ALEVE -25 MG Tabs   Generic drug:  Naproxen Sod-Diphenhydramine        Dose:  1 tablet   Take 1 tablet by mouth At Bedtime   Refills:  0       alum & mag hydroxide-simethicone 400-400-40 MG/5ML Susp suspension   Commonly known as:  MYLANTA ES/MAALOX  ES        Dose:  15 mL   Take 15 mLs by mouth every 2 hours as needed for heartburn   Refills:  0       AMOXICILLIN PO        Take 2,000 mg by mouth one hour before dental appointment   Refills:  0       ASPIRIN EC PO        Dose:  81 mg   Take 81 mg by mouth daily   Refills:  0       calcium-vitamin D 250-125 MG-UNIT Tabs per tablet   Commonly known as:  OSCAL        Dose:  2 tablet   Take 2 tablets by mouth 2 times daily   Refills:  0       LC-4 LIDOCAINE EX        Externally apply topically 4 times daily as needed (pain) 4% Ointment   Refills:  0       LISINOPRIL PO        Dose:  10 mg   Take 10 mg by mouth daily (Patient takes 1/2 of 20 mg tablet)   Refills:  0       MELATONIN PO        Dose:  3 mg   Take 3 mg by mouth At Bedtime   Refills:  0       MENTHOL-METHYL SALICYLATE EX        Apply thin layer 3 times daily as needed to low back, hip and/or knee for pain. Do not use at the same time as lidocaine ointment   Refills:  0       nystatin cream   Commonly known as:   MYCOSTATIN        Apply topically 2 times daily Apply to redness in groin folds/lower abdomen   Refills:  0       OMEPRAZOLE PO        Dose:  40 mg   Take 40 mg by mouth 2 times daily (Patient takes two 20mg capsules)   Refills:  0       PRESERVISION AREDS 2 Caps        Dose:  1 tablet   Take 1 tablet by mouth 2 times daily   Refills:  0       psyllium Packet   Commonly known as:  METAMUCIL/KONSYL        Dose:  1 packet   Take 1 packet by mouth daily as needed for constipation   Refills:  0       * sennosides 8.6 MG tablet   Commonly known as:  SENOKOT        Dose:  2 tablet   Take 2 tablets by mouth daily   Refills:  0       * sennosides 8.6 MG tablet   Commonly known as:  SENOKOT        Take by mouth daily as needed for constipation   Refills:  0       tamsulosin 0.4 MG capsule   Commonly known as:  FLOMAX        Dose:  0.4 mg   Take 0.4 mg by mouth daily   Refills:  0       VITAMIN D (CHOLECALCIFEROL) PO        Dose:  1000 Units   Take 1,000 Units by mouth daily   Refills:  0       * Notice:  This list has 2 medication(s) that are the same as other medications prescribed for you. Read the directions carefully, and ask your doctor or other care provider to review them with you.         Where to get your medicines      Some of these will need a paper prescription and others can be bought over the counter. Ask your nurse if you have questions.     Bring a paper prescription for each of these medications     traMADol 50 MG tablet       You don't need a prescription for these medications     cephALEXin 500 MG capsule               ANTIBIOTIC INSTRUCTION     You've Been Prescribed an Antibiotic - Now What?  Your healthcare team thinks that you or your loved one might have an infection. Some infections can be treated with antibiotics, which are powerful, life-saving drugs. Like all medications, antibiotics have side effects and should only be used when necessary. There are some important things you should know about  your antibiotic treatment.      Your healthcare team may run tests before you start taking an antibiotic.    Your team may take samples (e.g., from your blood, urine or other areas) to run tests to look for bacteria. These test can be important to determine if you need an antibiotic at all and, if you do, which antibiotic will work best.      Within a few days, your healthcare team might change or even stop your antibiotic.    Your team may start you on an antibiotic while they are working to find out what is making you sick.    Your team might change your antibiotic because test results show that a different antibiotic would be better to treat your infection.    In some cases, once your team has more information, they learn that you do not need an antibiotic at all. They may find out that you don't have an infection, or that the antibiotic you're taking won't work against your infection. For example, an infection caused by a virus can't be treated with antibiotics. Staying on an antibiotic when you don't need it is more likely to be harmful than helpful.      You may experience side effects from your antibiotic.    Like all medications, antibiotics have side effects. Some of these can be serious.    Let you healthcare team know if you have any known allergies when you are admitted to the hospital.    One significant side effect of nearly all antibiotics is the risk of severe and sometimes deadly diarrhea caused by Clostridium difficile (C. Difficile). This occurs when a person takes antibiotics because some good germs are destroyed. Antibiotic use allows C. diificile to take over, putting patients at high risk for this serious infection.    As a patient or caregiver, it is important to understand your or your loved one's antibiotic treatment. It is especially important for caregivers to speak up when patients can't speak for themselves. Here are some important questions to ask your healthcare team.    What  infection is this antibiotic treating and how do you know I have that infection?    What side effects might occur from this antibiotic?    How long will I need to take this antibiotic?    Is it safe to take this antibiotic with other medications or supplements (e.g., vitamins) that I am taking?     Are there any special directions I need to know about taking this antibiotic? For example, should I take it with food?    How will I be monitored to know whether my infection is responding to the antibiotic?    What tests may help to make sure the right antibiotic is prescribed for me?      Information provided by:  www.cdc.gov/getsmart  U.S. Department of Health and Human Services  Centers for disease Control and Prevention  National Center for Emerging and Zoonotic Infectious Diseases  Division of Healthcare Quality Promotion         Protect others around you: Learn how to safely use, store and throw away your medicines at www.disposemymeds.org.             Medication List: This is a list of all your medications and when to take them. Check marks below indicate your daily home schedule. Keep this list as a reference.      Medications           Morning Afternoon Evening Bedtime As Needed    ACETAMINOPHEN PO   Take 1,000 mg by mouth 3 times daily   Last time this was given:  1,000 mg on 12/12/2017  9:02 AM                                ALEVE -25 MG Tabs   Take 1 tablet by mouth At Bedtime   Generic drug:  Naproxen Sod-Diphenhydramine                                alum & mag hydroxide-simethicone 400-400-40 MG/5ML Susp suspension   Commonly known as:  MYLANTA ES/MAALOX  ES   Take 15 mLs by mouth every 2 hours as needed for heartburn                                AMOXICILLIN PO   Take 2,000 mg by mouth one hour before dental appointment                                ASPIRIN EC PO   Take 81 mg by mouth daily   Last time this was given:  81 mg on 12/12/2017  9:03 AM                                calcium-vitamin D  250-125 MG-UNIT Tabs per tablet   Commonly known as:  OSCAL   Take 2 tablets by mouth 2 times daily   Last time this was given:  2 tablets on 12/12/2017  9:03 AM                                cephALEXin 500 MG capsule   Commonly known as:  KEFLEX   Take 1 capsule (500 mg) by mouth 3 times daily for 5 days                                LC-4 LIDOCAINE EX   Externally apply topically 4 times daily as needed (pain) 4% Ointment                                LISINOPRIL PO   Take 10 mg by mouth daily (Patient takes 1/2 of 20 mg tablet)   Last time this was given:  10 mg on 12/12/2017  9:03 AM                                MELATONIN PO   Take 3 mg by mouth At Bedtime   Last time this was given:  3 mg on 12/11/2017  8:39 PM                                MENTHOL-METHYL SALICYLATE EX   Apply thin layer 3 times daily as needed to low back, hip and/or knee for pain. Do not use at the same time as lidocaine ointment                                metoprolol 25 MG tablet   Commonly known as:  LOPRESSOR   Take 1 tablet (25 mg) by mouth daily   Last time this was given:  25 mg on 12/12/2017  9:02 AM                                nystatin cream   Commonly known as:  MYCOSTATIN   Apply topically 2 times daily Apply to redness in groin folds/lower abdomen                                OMEPRAZOLE PO   Take 40 mg by mouth 2 times daily (Patient takes two 20mg capsules)   Last time this was given:  40 mg on 12/12/2017  9:03 AM                                PRESERVISION AREDS 2 Caps   Take 1 tablet by mouth 2 times daily                                psyllium Packet   Commonly known as:  METAMUCIL/KONSYL   Take 1 packet by mouth daily as needed for constipation                                * sennosides 8.6 MG tablet   Commonly known as:  SENOKOT   Take 2 tablets by mouth daily   Last time this was given:  1 tablet on 12/10/2017  1:14 PM                                * sennosides 8.6 MG tablet   Commonly known as:  SENOKOT    Take by mouth daily as needed for constipation   Last time this was given:  1 tablet on 12/10/2017  1:14 PM                                sertraline 25 MG tablet   Commonly known as:  ZOLOFT   Take 1 tablet (25 mg) by mouth daily   Last time this was given:  25 mg on 12/12/2017  9:02 AM                                tamsulosin 0.4 MG capsule   Commonly known as:  FLOMAX   Take 0.4 mg by mouth daily   Last time this was given:  0.4 mg on 12/12/2017  9:02 AM                                traMADol 50 MG tablet   Commonly known as:  ULTRAM   Take 2 tablets (100 mg) by mouth 3 times daily as needed   Last time this was given:  100 mg on 12/11/2017  8:39 PM                                VITAMIN D (CHOLECALCIFEROL) PO   Take 1,000 Units by mouth daily   Last time this was given:  1,000 Units on 12/12/2017  9:02 AM                                * Notice:  This list has 2 medication(s) that are the same as other medications prescribed for you. Read the directions carefully, and ask your doctor or other care provider to review them with you.

## 2017-12-08 NOTE — ED PROVIDER NOTES
"  History     Chief Complaint:  Extremity Weakness (Pt having difficulties with L arm; staff states that symptoms started around 7am and resolved by 730am.  EMS was not called because they \"thought it was a TIA\" per EMS.)      ANUJ Fierro is a 89 year old male who presents with intermittent left arm weakness today. For 30 minutes this morning from 7 to 7:30 the staff noticed some weakness. They did not mention facial droop or difficulty speaking. The patient has had a stroke in the past. He was doing well during the day but then when his son saw him, he felt that he had trouble lifting up a glass and had weakness. Not sure exactly if this is been intermittent during the day or continuous. No headache, no falls recently. He gets around in a motor scooter or a wheelchair but does not ambulate. His speech was somewhat garbled when his son came to the nursing home this afternoon and he was more confused than normal. He has not been ill recently with fevers or chills but has had UTIs in the past and has been going more frequently. His son thought that he had a little bit of a facial droop on the left now in his left arm was weaker. No other associated signs or symptoms.    Allergies:  No Known Allergies     Medications:    oxyCODONE (ROXICODONE)   sertraline (ZOLOFT)   metoprolol (LOPRESSOR)   AMOXICILLIN PO  ASPIRIN EC PO  lidocaine (XYLOCAINE) 5 % ointment  LISINOPRIL PO  MENTHOL-METHYL SALICYLATE EX  tamsulosin (FLOMAX)   psyllium (METAMUCIL/KONSYL)   sennosides (SENOKOT)     Past Medical History:    Arthritis   Benign prostatic hypertrophy   Cerebral infarction (H)   CKD (chronic kidney disease)   GERD (gastroesophageal reflux disease)   Hypertension   Insomnia     Past Surgical History:    CATARACT IOL, RT/LT    right shoulder arthroplasty  left knee surgery    Family History:    No family history on file.    Social History:  The patient denies tobacco or alcohol use.  Marital Status:   [2] " "    Review of Systems   Unable to perform ROS: Dementia   Constitutional: Negative for fever.   Respiratory: Negative for shortness of breath.    Cardiovascular: Negative for chest pain.   Gastrointestinal: Negative for abdominal pain.   Neurological: Negative for dizziness.         Physical Exam   First Vitals:  BP: 188/73  Pulse: 113  Heart Rate: 87  Temp: 98.7  F (37.1  C)  Resp: 16  Height: 167.6 cm (5' 6\")  Weight: 79.4 kg (175 lb)  SpO2: 97 %      Physical Exam   I reviewed the vital signs and nursing notes.  General: Patient is awake and alert, hard of hearing. Seems restless.  HEENT: Some left flattening of the nasolabial fold. Speech is a little slurred. No nasal discharge. Mucous membranes are moist. No tongue deviation.  Eyes: Pupils are equal and reactive, conjugate gaze is noted.  Pulmonary: Lungs are clear to auscultation, no retractions or wheezing.  Cardiovascular: Irregular pulse but normal rate, good peripheral perfusion with capillary refill less than 2 seconds. No JVD.  Musculoskeletal: 1+ lower extremity edema noted bilaterally, moves all extremities but weakness in the left arm and hand noted. Legs are diffusely weak.  Skin: Not diaphoretic, no rash, no obvious bruises.  Neuro: Left hand  weakness, hard time following commands at times. Speech is slightly slurred and there seems to be a little bit of a left facial droop. He does have some chronic left visual field defect from previous stroke. Leg strength is bilaterally weak. He has dementia. He answers many questions appropriately. Sensation seems to be intact and there is no obvious neglect. Very difficult to do an NIH score on this patient because of his difficulty in following commands.  Psych: Pleasantly demented, some agitation noted. No obvious delusions or hallucinations.  Abdomen: Nontender, no organomegaly. No pulsatile mass.    Emergency Department Course   ECG:  Indication: extremity weakness  Time: 1600  Vent. Rate 91 bpm. IL " interval 224. QRS duration 66. QT/QTc 322/396. P-R-T axis 79 56 56. Sinus rhythm with 1st degree AV block. Otherwise normal ECG. No significant change compared to EKG dated 02/08/2016.     Read time: 1605    Laboratory:  UA reflex to micro and culture: pH Urine 8.0(H), Leukocyte Esterase Urine - Trace (A) o/w negative    UA micro: WBC/HPF 5-10 (A), Bacteria - Few (A), o/w negative    Urine Culture Aerobic bacterial (In Process)    CBC: WBC: 7.3, HGB: 9.9(L), PLT: 189    BMP: Glucose 120 (H), GFR Estimate 57 (L), o/w WNL (Creatinine: 1.19)      Interventions:  2127 Sodium chloride infusion IV  1902 Zofran 4 mg IV  2043 Rocephin 1 g in NS IV   2057 Zofran 4 mg IV    Emergency Department Course:  Nursing notes and vitals reviewed.     1610 I performed an exam of the patient as documented above.     IV inserted. Medicine administered as documented above.     Blood drawn. This was sent to the lab for further testing, results above.    The patient provided a urine sample here in the emergency department. This was sent for laboratory testing, findings above.     EKG obtained in the ED, see results above.     The patient provided a urine sample here in the emergency department. This was sent for laboratory testing, findings above.     1831 I rechecked the patient and discussed the results of his workup thus far.     1900 I rechecked the patient and discussed the results of his workup thus far.     1915 I consulted with Dr. Ríos, hospitalist, regarding the patient's history and presentation here in the emergency department.    1919  I consulted with Dr. Ríos of the hospitalist services. They are in agreement to accept the patient for admission.    Findings and plan explained to the Patient who consents to admission. Discussed the patient with Dr. Ríos, who will admit the patient to a neuro OBS bed for further monitoring, evaluation, and treatment.        Impression & Plan      Medical Decision Making:  The  patient comes in with possible stroke again. He is 89 years old, is DNR/DNI. He had a small stroke in the past year that did not show up on MRI. It appears he has some left-sided weakness which may be new but the son isn't sure if he had any weakness prior. His blood work came back with stable chronic renal failure, normal CBC. His urine was a catheterized specimen and did have some white cells and bacteria but nitrite was negative and it was clear. I've ordered a culture. It's possible that he does have a UTI so I will start Rocephin IV. After discussing with the son that he is not a candidate for any intervention for his stroke, the son did not want imaging but states he can't go back to his assisted living memory care and will need placement in an TCU or nursing home. He had some nausea while he was here but no headache. He was given Zofran for that and had a couple episodes of emesis. He starting to become more agitated at night because of his sundowners and will be given appropriate medication after he arrives on the floor and the hospitalist has a chance to see him. I discussed the case with the hospitalist who will admit the patient on a neuro obs bed and consideration of placement tomorrow.  Neurology can be consulted in the morning as well. If he develops a headache and continued vomiting, a CT scan of his head could be obtained to make sure he doesn't have a bleed, but he would not be a candidate for surgery anyway. Son wants supportive cares.      Diagnosis:    ICD-10-CM    1. Left-sided weakness R53.1 Urine Culture Aerobic Bacterial   2. Prerenal azotemia R79.89    3. Dehydration E86.0     Mild   4. Dementia without behavioral disturbance, unspecified dementia type F03.90    5. Anemia, unspecified type D64.9        Disposition:  Admitted to neuro OBS.  Neurology consultation the morning, social service consult for probable placement.    Discharge Medications:    I, Cristian Spring, am serving as a scribe  on 12/8/2017 at 4:10 PM to personally document services performed by Lashay Marino MD based on my observations and the provider's statements to me.       Cristian Spring  12/8/2017    EMERGENCY DEPARTMENT       Lashay Marino MD  12/09/17 0029

## 2017-12-08 NOTE — IP AVS SNAPSHOT
` [place] [address] [facility]: [phone]            Medication Administration Report for Olaf Fierro as of 12/12/17 1438   Legend:    Given Hold Not Given Due Canceled Entry Other Actions    Time Time (Time) Time  Time-Action       Inactive    Active    Linked        Medications 12/06/17 12/07/17 12/08/17 12/09/17 12/10/17 12/11/17 12/12/17    acetaminophen (TYLENOL) Suppository 650 mg  Dose: 650 mg Freq: EVERY 4 HOURS PRN Route: RE  PRN Reason: mild pain  Start: 12/08/17 2217   Admin Instructions: Alternate ibuprofen (if ordered) with acetaminophen.  Maximum acetaminophen dose from all sources = 75 mg/kg/day not to exceed 4 grams/day.               acetaminophen (TYLENOL) tablet 1,000 mg  Dose: 1,000 mg Freq: 3 TIMES DAILY Route: PO  Start: 12/08/17 2230   Admin Instructions: Maximum acetaminophen dose from all sources = 75 mg/kg/day not to exceed 4 gram       (2247)-Not Given [C]        0851 (1,000 mg)-Given       1623 (1,000 mg)-Given       2101 (1,000 mg)-Given        0940 (1,000 mg)-Given       1611 (1,000 mg)-Given       2108 (1,000 mg)-Given        0832 (1,000 mg)-Given       1559 (1,000 mg)-Given       2038 (1,000 mg)-Given               0902 (1,000 mg)-Given       [ ] 1600       [ ] 2200           acetaminophen (TYLENOL) tablet 650 mg  Dose: 650 mg Freq: EVERY 4 HOURS PRN Route: PO  PRN Reason: mild pain  Start: 12/08/17 2217   Admin Instructions: Alternate ibuprofen (if ordered) with acetaminophen.  Maximum acetaminophen dose from all sources = 75 mg/kg/day not to exceed 4 grams/day.           0119 (650 mg)-Given           alum & mag hydroxide-simethicone (MYLANTA ES/MAALOX  ES) suspension 15 mL  Dose: 15 mL Freq: EVERY 2 HOURS PRN Route: PO  PRN Reason: heartburn  Start: 12/08/17 2217   Admin Instructions: Shake well.               aspirin EC EC tablet 81 mg  Dose: 81 mg Freq: DAILY Route: PO  Start: 12/09/17 0900       0851 (81 mg)-Given        0941 (81 mg)-Given         0832 (81 mg)-Given        0903 (81 mg)-Given           calcium-vitamin D (OSCAL) 250-125 MG-UNIT per tablet 2 tablet  Dose: 2 tablet Freq: 2 TIMES DAILY Route: PO  Start: 12/08/17 2230      (2248)-Not Given [C]        0851 (2 tablet)-Given       2101 (2 tablet)-Given        0940 (2 tablet)-Given       2108 (2 tablet)-Given        0833 (2 tablet)-Given       2039 (2 tablet)-Given        0903 (2 tablet)-Given       [ ] 2100           cefTRIAXone (ROCEPHIN) 1 g in 10 mL SWFI for IVP  Dose: 1 g Freq: EVERY 24 HOURS Route: IV  Indications of Use: URINARY TRACT INFECTION  Start: 12/09/17 2100   Admin Instructions: Give IVP over 3 minutes        2112 (1 g)-Given        2114 (1 g)-Given        2130 (1 g)-Given        [ ] 2100           cholecalciferol (vitamin D3) tablet 1,000 Units  Dose: 1,000 Units Freq: DAILY Route: PO  Start: 12/09/17 0900       0851 (1,000 Units)-Given        0940 (1,000 Units)-Given        0833 (1,000 Units)-Given        0902 (1,000 Units)-Given           lisinopril (PRINIVIL/ZESTRIL) tablet 10 mg  Dose: 10 mg Freq: DAILY Route: PO  Start: 12/10/17 0900   Admin Instructions: Hold if SBP <130         0940 (10 mg)-Given        0833 (10 mg)-Given        0903 (10 mg)-Given           melatonin tablet 3 mg  Dose: 3 mg Freq: AT BEDTIME Route: PO  Start: 12/08/17 2230      2242 (3 mg)-Given        2101 (3 mg)-Given        2108 (3 mg)-Given        2039 (3 mg)-Given               [ ] 2200           metoprolol (LOPRESSOR) tablet 25 mg  Dose: 25 mg Freq: DAILY Route: PO  Start: 12/09/17 0900       0852 (25 mg)-Given        0940 (25 mg)-Given        0833 (25 mg)-Given        0902 (25 mg)-Given           naloxone (NARCAN) injection 0.1-0.4 mg  Dose: 0.1-0.4 mg Freq: EVERY 2 MIN PRN Route: IV  PRN Reason: opioid reversal  Start: 12/09/17 1949   Admin Instructions: For respiratory rate LESS than or EQUAL to 8.  Partial reversal dose:  0.1 mg titrated q 2 minutes for Analgesia Side Effects Monitoring Sedation Level  of 3 (frequently drowsy, arousable, drifts to sleep during conversation).Full reversal dose:  0.4 mg bolus for Analgesia Side Effects Monitoring Sedation Level of 4 (somnolent, minimal or no response to stimulation).  For ordered doses up to 2mg give IVP. Give each 0.4mg over 15 seconds in emergency situations. For non-emergent situations further dilute in 9mL of NS to facilitate titration of response.               omeprazole (priLOSEC) CR capsule 40 mg  Dose: 40 mg Freq: 2 TIMES DAILY Route: PO  Start: 12/08/17 2230      (2248)-Not Given [C]        0851 (40 mg)-Given       2101 (40 mg)-Given        0940 (40 mg)-Given       2108 (40 mg)-Given        0832 (40 mg)-Given       2038 (40 mg)-Given        0903 (40 mg)-Given       [ ] 2100           ondansetron (ZOFRAN-ODT) ODT tab 4 mg  Dose: 4 mg Freq: EVERY 6 HOURS PRN Route: PO  PRN Reasons: nausea,vomiting  Start: 12/08/17 2217   Admin Instructions: This is Step 1 of nausea and vomiting management.  If nausea not resolved in 15 minutes, go to Step 2 prochlorperazine (COMPAZINE). Do not push through foil backing. Peel back foil and gently remove. Place on tongue immediately. Administration with liquid unnecessary              Or  ondansetron (ZOFRAN) injection 4 mg  Dose: 4 mg Freq: EVERY 6 HOURS PRN Route: IV  PRN Reasons: nausea,vomiting  Start: 12/08/17 2217   Admin Instructions: This is Step 1 of nausea and vomiting management.  If nausea not resolved in 15 minutes, go to Step 2 prochlorperazine (COMPAZINE).  Irritant. For ordered doses up to 4 mg, give IV Push undiluted over 2-5 minutes.               psyllium (METAMUCIL/KONSYL) Packet 1 packet  Dose: 1 packet Freq: DAILY PRN Route: PO  PRN Reason: constipation  Start: 12/08/17 2217   Admin Instructions: Powder should be diluted with fluid before given.               sennosides (SENOKOT) tablet 1 tablet  Dose: 1 tablet Freq: DAILY PRN Route: PO  PRN Reason: constipation  Start: 12/08/17 2217        1314 (1  tablet)-Given             sertraline (ZOLOFT) tablet 25 mg  Dose: 25 mg Freq: DAILY Route: PO  Start: 12/09/17 0900       0852 (25 mg)-Given        0940 (25 mg)-Given        0833 (25 mg)-Given        0902 (25 mg)-Given           sodium chloride (PF) 0.9% PF flush 3 mL  Dose: 3 mL Freq: EVERY 8 HOURS Route: IK  Start: 12/08/17 1618   Admin Instructions: And Q1H PRN, to lock peripheral IV dormant line.       (1852)-Not Given        (0159)-Not Given       (0733)-Not Given       (1627)-Not Given        (0046)-Not Given       (0746)-Not Given       1612 (3 mL)-Given        0026 (3 mL)-Given       0840 (3 mL)-Given       1559 (3 mL)-Given        0121 (3 mL)-Given       [ ] 0818       [ ] 1618           sodium chloride (PF) 0.9% PF flush 3 mL  Dose: 3 mL Freq: EVERY 1 HOUR PRN Route: IK  PRN Reason: line flush  PRN Comment: for peripheral IV flush post IV meds  Start: 12/08/17 1616              tamsulosin (FLOMAX) capsule 0.4 mg  Dose: 0.4 mg Freq: DAILY Route: PO  Start: 12/09/17 0900   Admin Instructions: Administer 30 minutes after the same meal each day.  Capsules should be swallowed whole; do not crush chew or open.        0852 (0.4 mg)-Given        0940 (0.4 mg)-Given        0833 (0.4 mg)-Given        0902 (0.4 mg)-Given           traMADol (ULTRAM) tablet 100 mg  Dose: 100 mg Freq: 3 TIMES DAILY PRN Route: PO  PRN Reasons: moderate pain,severe pain  Start: 12/11/17 1104         1159 (100 mg)-Given       2039 (100 mg)-Given           Discontinued Medications  Medications 12/06/17 12/07/17 12/08/17 12/09/17 12/10/17 12/11/17 12/12/17         Rate: 125 mL/hr Freq: CONTINUOUS Route: IV  Start: 12/08/17 1618   End: 12/10/17 0728      1842 ( )-New Bag       2127-Stopped       2234 ( )-New Bag        0639 ( )-New Bag       0852-Paused [C]       1030 ( )-Restarted       1712 ( )-New Bag        0111 ( )-New Bag       0728-Med Discontinued           Dose: 1-2 tablet Freq: EVERY 4 HOURS PRN Route: PO  PRN Reason: moderate to  severe pain  Start: 12/08/17 2217   End: 12/11/17 1118   Admin Instructions: Start with the lowest dose.    May adjust dose by 1 tablet every 4 hours as needed for pain control or improvement in physical function.  Hold dose for analgesic side effects.  Notify provider to assess for uncontrolled pain or analgesic side effects.  Maximum acetaminophen dose from all sources= 75 mg/kg/day not to exceed 4 grams          1118-Med Discontinued          Dose: 0.1-0.4 mg Freq: EVERY 2 MIN PRN Route: IV  PRN Reason: opioid reversal  Start: 12/08/17 2217   End: 12/09/17 1958   Admin Instructions: For respiratory rate LESS than or EQUAL to 8.  Partial reversal dose:  0.1 mg titrated q 2 minutes for Analgesia Side Effects Monitoring Sedation Level of 3 (frequently drowsy, arousable, drifts to sleep during conversation).Full reversal dose:  0.4 mg bolus for Analgesia Side Effects Monitoring Sedation Level of 4 (somnolent, minimal or no response to stimulation).  For ordered doses up to 2mg give IVP. Give each 0.4mg over 15 seconds in emergency situations. For non-emergent situations further dilute in 9mL of NS to facilitate titration of response.        1958-Med Discontinued            Dose: 2.5-5 mg Freq: 2 TIMES DAILY PRN Route: PO  PRN Reason: moderate to severe pain  Start: 12/08/17 2217   End: 12/11/17 1118         1118-Med Discontinued

## 2017-12-08 NOTE — IP AVS SNAPSHOT
"` `     [place] [address] [facility]: [phone]                                              INTERAGENCY TRANSFER FORM - NURSING   2017                    Hospital Admission Date: 2017  FRANKIE NORRIS   : 1920  Sex: Male        Attending Provider: Vesna Ríos MD     Allergies:  No Known Allergies    Infection:  None   Service:  HOSPITALIST    Ht:  1.676 m (5' 6\")   Wt:  79.4 kg (175 lb)   Admission Wt:  79.4 kg (175 lb)    BMI:  28.25 kg/m 2   BSA:  1.92 m 2            Patient PCP Information     Provider PCP Type    RUSTY ANDERSON General      Current Code Status     Date Active Code Status Order ID Comments User Context       Prior      Code Status History     Date Active Date Inactive Code Status Order ID Comments User Context    2017 12:25 PM  DNR/DNI [id number]  Lee Gold MD Outpatient    2017  7:49 PM 2017 12:25 PM DNR/DNI [id number]  Vesna Ríos MD Inpatient    2017 10:17 PM 2017  7:49 PM DNR/DNI [id number]  Vesna Ríos MD Inpatient    2016 10:56 AM 2017 10:17 PM DNR/DNI [id number]  Kishore Rodriguez DO Outpatient    2016 12:29 AM 2016 10:56 AM DNR/DNI [id number]  Michel Chase MD Inpatient      Advance Directives        Does patient have a scanned Advance Directive/ACP document in EPIC?           No        Hospital Problems as of 2017              Priority Class Noted POA    Weakness Medium  2017 Yes    Encephalopathy Medium  2017 Yes      Non-Hospital Problems as of 2017              Priority Class Noted    CVA (cerebral vascular accident) (H) Medium  2016    Multiple fractures of right foot, sequela Medium  2016    NSTEMI (non-ST elevated myocardial infarction) (H) Medium  2016    Atrial fibrillation with RVR (H) Medium  2016    Late effect of cerebrovascular accident Medium  2016    Essential hypertension Medium  2016    Dysphagia, " "unspecified dysphagia Medium  2/11/2016    Insomnia, unspecified insomnia Medium  2/18/2016    Constipation, unspecified constipation type Medium  2/18/2016    Arthritis Medium  2/18/2016      Immunizations     None         END      ASSESSMENT     Discharge Profile Flowsheet     EXPECTED DISCHARGE     Inspection of bony prominences  Full 12/12/17 1127    Expected Discharge Date  -- (12/11-12/12 pending TCU placement) 12/11/17 1517   Skin WDL  ex 12/12/17 1127    DISCHARGE NEEDS ASSESSMENT     Skin Color/Characteristics  bruised (ecchymotic) 12/12/17 1127    Equipment Currently Used at Home  wheelchair, power 12/09/17 1447   Skin Temperature  warm 12/12/17 1127    Equipment Used at Home  walker, rolling 02/08/16 1528   Skin Moisture  flaky;dry 12/12/17 1127    GASTROINTESTINAL (ADULT,PEDIATRIC,OB)     Skin Elasticity  quick return to original state 12/12/17 1127    GI WDL  WDL 12/12/17 0342   Skin Integrity  bruise(s);scab(s);scar(s) 12/12/17 1127    Last Bowel Movement  12/10/17 12/11/17 0030   Inspection under devices  Full 12/11/17 0030    Passing flatus  yes 12/12/17 0342   SAFETY      COMMUNICATION ASSESSMENT     Safety WDL  WDL 12/12/17 1127    Patient's communication style  spoken language (English or Bilingual) 12/08/17 2250   All Alarms  alarm(s) activated and audible 12/12/17 1127    SKIN                        Assessment WDL (Within Defined Limits) Definitions           Safety WDL     Effective: 09/28/15    Row Information: <b>WDL Definition:</b> Bed in low position, wheels locked; call light in reach; upper side rails up x 2; ID band on<br> <font color=\"gray\"><i>Item=AS safety wdl>>List=AS safety wdl>>Version=F14</i></font>      Skin WDL     Effective: 09/28/15    Row Information: <b>WDL Definition:</b> Warm; dry; intact; elastic; without discoloration; pressure points without redness<br> <font color=\"gray\"><i>Item=AS skin wdl>>List=AS skin wdl>>Version=F14</i></font>      Vitals     Vital Signs Flowsheet " "    VITAL SIGNS     Side Effects Monitoring: Respiratory Depth  N 12/12/17 0312    Temp  98  F (36.7  C) 12/12/17 0758   Side Effects Monitoring: Sedation Level  1 12/12/17 0312    Temp src  Oral 12/12/17 0758   HEIGHT AND WEIGHT      Resp  16 12/12/17 0758   Height  1.676 m (5' 6\") 12/08/17 1555    Pulse  75 12/10/17 0719   Height Method  Stated 12/08/17 1555    Heart Rate  84 12/12/17 0758   Weight  79.4 kg (175 lb) 12/08/17 1555    Pulse/Heart Rate Source  Monitor 12/12/17 0758   BSA (Calculated - sq m)  1.92 12/08/17 1555    BP  148/62 12/12/17 0758   BMI (Calculated)  28.3 12/08/17 1555    BP Location  Left arm 12/12/17 0758   LARRY COMA SCALE      OXYGEN THERAPY     Best Eye Response  4-->(E4) spontaneous 12/11/17 0026    SpO2  94 % 12/12/17 0758   Best Motor Response  6-->(M6) obeys commands 12/11/17 0026    O2 Device  None (Room air) 12/12/17 0758   Best Verbal Response  5-->(V5) oriented 12/11/17 0027    PAIN/COMFORT     [place] Coma Scale Score  15 12/11/17 0027    Patient Currently in Pain  yes 12/12/17 0111   POSITIONING      Preferred Pain Scale  number (Numeric Rating Pain Scale) 12/10/17 0015   Body Position  other (see comments) (up in chair) 12/12/17 1127    0-10 Pain Scale  6 12/12/17 0903   Positioning/Transfer Devices  pillows;in use 12/12/17 0758    Pain Location  Generalized 12/12/17 0111   Chair  Upright in chair 12/11/17 1802    Pain Orientation  Right 12/11/17 0021   Head of Bed (HOB)  HOB at 20 degrees 12/12/17 0758    Pain Descriptors  Aching 12/12/17 0111   DAILY CARE      Pain Intervention(s)  Medication (See eMAR) 12/11/17 2326   Activity Management  activity encouraged 12/12/17 1127    Response to Interventions  Relief 12/12/17 0312   Activity Assistance Provided  assistance, 2 people 12/12/17 1127    ANALGESIA SIDE EFFECTS MONITORING     Assistive Device Utilized  mechanical lift 12/12/17 7368    Side Effects Monitoring: Respiratory Quality  R 12/12/17 0312                 Patient " Lines/Drains/Airways Status    Active LINES/DRAINS/AIRWAYS     Name: Placement date: Placement time: Site: Days: Last dressing change:    Peripheral IV 12/10/17 Right;Posterior Lower forearm 12/10/17   0315   Lower forearm   2     Wound 02/08/16 Left Elbow Abrasion(s) 02/08/16      Elbow   673             Patient Lines/Drains/Airways Status    Active PICC/CVC     None            Intake/Output Detail Report     Date Intake     Output Net    Shift P.O. I.V. IV Piggyback Total Urine Total       Day 12/11/17 0700 - 12/11/17 1459 640 -- -- 640 150 150 490    Janet 12/11/17 1500 - 12/11/17 2259 -- -- -- -- -- -- 0    Noc 12/11/17 2300 - 12/12/17 0659 -- -- -- -- 425 425 -425    Day 12/12/17 0700 - 12/12/17 1459 650 -- -- 650 300 300 350    Janet 12/12/17 1500 - 12/12/17 2259 -- -- -- -- -- -- 0      Last Void/BM       Most Recent Value    Urine Occurrence 3 at 12/12/2017 1319    Stool Occurrence 0 at 12/12/2017 1319      Case Management/Discharge Planning     Case Management/Discharge Planning Flowsheet     REFERRAL INFORMATION     Equipment Used at Home  walker, rolling 02/08/16 1528    Arrived From  assisted living facility 02/08/16 1417   FINAL NOTE      Referral Source  physician 12/11/17 1501   Final Note  -- (D/C to [facility]) 12/12/17 1240    Reason For Consult  discharge planning 12/11/17 1502   FINAL RESOURCES      CTS Assigned to Case  -- (Yuri Silva) 12/11/17 1501   Equipment Currently Used at Home  wheelchair, power 12/09/17 1447    LIVING ENVIRONMENT     ABUSE RISK SCREEN      Lives With  -- 12/11/17 1502   QUESTION TO PATIENT:  Has a member of your family or a partner(now or in the past) intimidated, hurt, manipulated, or controlled you in any way?  no 12/08/17 1618    Living Arrangements  assisted living ([facility]) 12/09/17 1447   QUESTION TO PATIENT: Do you feel safe going back to the place where you are living?  yes 12/08/17 2250    Able to Return to Prior Living Arrangements  -- 12/11/17 6452    OBSERVATION: Is there reason to believe there has been maltreatment of a vulnerable adult (ie. Physical/Sexual/Emotional abuse, self neglect, lack of adequate food, shelter, medical care, or financial exploitation)?  no 12/08/17 2250    COPING/STRESS     (R) MENTAL HEALTH SUICIDE RISK      Major Change/Loss/Stressor  none 12/09/17 1420   Are you depressed or being treated for depression?  No 12/09/17 1420    EXPECTED DISCHARGE     HOMICIDE RISK      Expected Discharge Date  -- (12/11-12/12 pending TCU placement) 12/11/17 1517   Feels Like Hurting Others  no 12/08/17 2250    DISCHARGE PLANNING

## 2017-12-08 NOTE — Clinical Note
Admitting Physician: LONDON BOBO [[id number]]   Bed Type: Adult Med/Surg [[id number]]   Bed request comments: 73 obs

## 2017-12-09 ENCOUNTER — APPOINTMENT (OUTPATIENT)
Dept: SPEECH THERAPY | Facility: CLINIC | Age: 82
DRG: 071 | End: 2017-12-09
Attending: INTERNAL MEDICINE
Payer: COMMERCIAL

## 2017-12-09 ENCOUNTER — APPOINTMENT (OUTPATIENT)
Dept: PHYSICAL THERAPY | Facility: CLINIC | Age: 82
DRG: 071 | End: 2017-12-09
Attending: INTERNAL MEDICINE
Payer: COMMERCIAL

## 2017-12-09 PROBLEM — G93.40 ENCEPHALOPATHY: Status: ACTIVE | Noted: 2017-12-09

## 2017-12-09 LAB
ALBUMIN SERPL-MCNC: 3 G/DL (ref 3.4–5)
ALP SERPL-CCNC: 69 U/L (ref 40–150)
ALT SERPL W P-5'-P-CCNC: 20 U/L (ref 0–70)
ANION GAP SERPL CALCULATED.3IONS-SCNC: 9 MMOL/L (ref 3–14)
AST SERPL W P-5'-P-CCNC: 73 U/L (ref 0–45)
BILIRUB SERPL-MCNC: 0.3 MG/DL (ref 0.2–1.3)
BUN SERPL-MCNC: 24 MG/DL (ref 7–30)
CALCIUM SERPL-MCNC: 7.9 MG/DL (ref 8.5–10.1)
CHLORIDE SERPL-SCNC: 104 MMOL/L (ref 94–109)
CO2 SERPL-SCNC: 21 MMOL/L (ref 20–32)
CREAT SERPL-MCNC: 1.28 MG/DL (ref 0.66–1.25)
ERYTHROCYTE [DISTWIDTH] IN BLOOD BY AUTOMATED COUNT: 13.9 % (ref 10–15)
GFR SERPL CREATININE-BSD FRML MDRD: 52 ML/MIN/1.7M2
GLUCOSE SERPL-MCNC: 108 MG/DL (ref 70–99)
HCT VFR BLD AUTO: 25.5 % (ref 40–53)
HGB BLD-MCNC: 8.4 G/DL (ref 13.3–17.7)
MAGNESIUM SERPL-MCNC: 1.9 MG/DL (ref 1.6–2.3)
MCH RBC QN AUTO: 28.7 PG (ref 26.5–33)
MCHC RBC AUTO-ENTMCNC: 32.9 G/DL (ref 31.5–36.5)
MCV RBC AUTO: 87 FL (ref 78–100)
PLATELET # BLD AUTO: 159 10E9/L (ref 150–450)
POTASSIUM SERPL-SCNC: 4.1 MMOL/L (ref 3.4–5.3)
PROT SERPL-MCNC: 6.5 G/DL (ref 6.8–8.8)
RBC # BLD AUTO: 2.93 10E12/L (ref 4.4–5.9)
SODIUM SERPL-SCNC: 134 MMOL/L (ref 133–144)
TSH SERPL DL<=0.005 MIU/L-ACNC: 1.12 MU/L (ref 0.4–4)
WBC # BLD AUTO: 8.4 10E9/L (ref 4–11)

## 2017-12-09 PROCEDURE — 92610 EVALUATE SWALLOWING FUNCTION: CPT | Mod: GN | Performed by: SPEECH-LANGUAGE PATHOLOGIST

## 2017-12-09 PROCEDURE — 40000193 ZZH STATISTIC PT WARD VISIT: Performed by: PHYSICAL THERAPIST

## 2017-12-09 PROCEDURE — 25000128 H RX IP 250 OP 636: Performed by: INTERNAL MEDICINE

## 2017-12-09 PROCEDURE — 97161 PT EVAL LOW COMPLEX 20 MIN: CPT | Mod: GP | Performed by: PHYSICAL THERAPIST

## 2017-12-09 PROCEDURE — 85027 COMPLETE CBC AUTOMATED: CPT | Performed by: INTERNAL MEDICINE

## 2017-12-09 PROCEDURE — 25000128 H RX IP 250 OP 636: Performed by: EMERGENCY MEDICINE

## 2017-12-09 PROCEDURE — 99207 ZZC CDG-CODE CATEGORY CHANGED: CPT | Performed by: INTERNAL MEDICINE

## 2017-12-09 PROCEDURE — 84443 ASSAY THYROID STIM HORMONE: CPT | Performed by: INTERNAL MEDICINE

## 2017-12-09 PROCEDURE — 40000225 ZZH STATISTIC SLP WARD VISIT: Performed by: SPEECH-LANGUAGE PATHOLOGIST

## 2017-12-09 PROCEDURE — 83735 ASSAY OF MAGNESIUM: CPT | Performed by: INTERNAL MEDICINE

## 2017-12-09 PROCEDURE — 99226 ZZC SUBSEQUENT OBSERVATION CARE,LEVEL III: CPT | Performed by: INTERNAL MEDICINE

## 2017-12-09 PROCEDURE — G0378 HOSPITAL OBSERVATION PER HR: HCPCS

## 2017-12-09 PROCEDURE — 80053 COMPREHEN METABOLIC PANEL: CPT | Performed by: INTERNAL MEDICINE

## 2017-12-09 PROCEDURE — 12000000 ZZH R&B MED SURG/OB

## 2017-12-09 PROCEDURE — 25000132 ZZH RX MED GY IP 250 OP 250 PS 637: Performed by: INTERNAL MEDICINE

## 2017-12-09 PROCEDURE — 27210995 ZZH RX 272: Performed by: INTERNAL MEDICINE

## 2017-12-09 PROCEDURE — 36415 COLL VENOUS BLD VENIPUNCTURE: CPT | Performed by: INTERNAL MEDICINE

## 2017-12-09 RX ORDER — LISINOPRIL 10 MG/1
10 TABLET ORAL DAILY
Status: DISCONTINUED | OUTPATIENT
Start: 2017-12-10 | End: 2017-12-12 | Stop reason: HOSPADM

## 2017-12-09 RX ORDER — SENNOSIDES 8.6 MG
TABLET ORAL DAILY PRN
COMMUNITY
End: 2017-12-14

## 2017-12-09 RX ORDER — NYSTATIN 100000 U/G
CREAM TOPICAL 2 TIMES DAILY
COMMUNITY
End: 2017-12-14 | Stop reason: DRUGHIGH

## 2017-12-09 RX ORDER — SENNOSIDES 8.6 MG
2 TABLET ORAL DAILY
COMMUNITY

## 2017-12-09 RX ORDER — NALOXONE HYDROCHLORIDE 0.4 MG/ML
.1-.4 INJECTION, SOLUTION INTRAMUSCULAR; INTRAVENOUS; SUBCUTANEOUS
Status: DISCONTINUED | OUTPATIENT
Start: 2017-12-09 | End: 2017-12-12 | Stop reason: HOSPADM

## 2017-12-09 RX ADMIN — SODIUM CHLORIDE: 9 INJECTION, SOLUTION INTRAVENOUS at 17:12

## 2017-12-09 RX ADMIN — VITAMIN D, TAB 1000IU (100/BT) 1000 UNITS: 25 TAB at 08:51

## 2017-12-09 RX ADMIN — ACETAMINOPHEN 1000 MG: 500 TABLET, FILM COATED ORAL at 16:23

## 2017-12-09 RX ADMIN — CEFTRIAXONE SODIUM 1 G: 10 INJECTION, POWDER, FOR SOLUTION INTRAVENOUS at 21:12

## 2017-12-09 RX ADMIN — TAMSULOSIN HYDROCHLORIDE 0.4 MG: 0.4 CAPSULE ORAL at 08:52

## 2017-12-09 RX ADMIN — SERTRALINE HYDROCHLORIDE 25 MG: 25 TABLET ORAL at 08:52

## 2017-12-09 RX ADMIN — SODIUM CHLORIDE: 9 INJECTION, SOLUTION INTRAVENOUS at 06:39

## 2017-12-09 RX ADMIN — ASPIRIN 81 MG: 81 TABLET, COATED ORAL at 08:51

## 2017-12-09 RX ADMIN — CALCIUM CARBONATE-CHOLECALCIFEROL TAB 250 MG-125 UNIT 2 TABLET: 250-125 TAB at 08:51

## 2017-12-09 RX ADMIN — ACETAMINOPHEN 1000 MG: 500 TABLET, FILM COATED ORAL at 08:51

## 2017-12-09 RX ADMIN — OMEPRAZOLE 40 MG: 20 CAPSULE, DELAYED RELEASE ORAL at 08:51

## 2017-12-09 RX ADMIN — ACETAMINOPHEN 1000 MG: 500 TABLET, FILM COATED ORAL at 21:01

## 2017-12-09 RX ADMIN — CALCIUM CARBONATE-CHOLECALCIFEROL TAB 250 MG-125 UNIT 2 TABLET: 250-125 TAB at 21:01

## 2017-12-09 RX ADMIN — LISINOPRIL 20 MG: 20 TABLET ORAL at 08:51

## 2017-12-09 RX ADMIN — MELATONIN 3 MG: 3 TAB ORAL at 21:01

## 2017-12-09 RX ADMIN — OMEPRAZOLE 40 MG: 20 CAPSULE, DELAYED RELEASE ORAL at 21:01

## 2017-12-09 RX ADMIN — METOPROLOL TARTRATE 25 MG: 25 TABLET ORAL at 08:52

## 2017-12-09 ASSESSMENT — ACTIVITIES OF DAILY LIVING (ADL)
FALL_HISTORY_WITHIN_LAST_SIX_MONTHS: NO
BATHING: 2-->ASSISTIVE PERSON
DRESS: 2-->ASSISTIVE PERSON
TOILETING: 2-->ASSISTIVE PERSON
COGNITION: 1 - ATTENTION OR MEMORY DEFICITS
RETIRED_EATING: 0-->INDEPENDENT
SWALLOWING: 0-->SWALLOWS FOODS/LIQUIDS WITHOUT DIFFICULTY
AMBULATION: 1-->ASSISTIVE EQUIPMENT
TRANSFERRING: 0-->INDEPENDENT
RETIRED_COMMUNICATION: 2-->DIFFICULTY UNDERSTANDING (NOT RELATED TO LANGUAGE BARRIER)

## 2017-12-09 NOTE — PLAN OF CARE
Problem: Patient Care Overview  Goal: Plan of Care/Patient Progress Review  Outcome: Completed Date Met: 12/09/17  OT: Reviewed chart and talked to PT.  Pt has no IP OT needs at this time, will DC from IP OT.

## 2017-12-09 NOTE — ED NOTES
"[facility]  ED Nurse Handoff Report    ED Chief complaint: Extremity Weakness (Pt having difficulties with L arm; staff states that symptoms started around 7am and resolved by 730am.  EMS was not called because they \"thought it was a TIA\" per EMS.)      ED Diagnosis:   Final diagnoses:   Left-sided weakness   Prerenal azotemia   Dehydration - Mild   Dementia without behavioral disturbance, unspecified dementia type   Anemia, unspecified type       Code Status: DNR / DNI    Allergies: No Known Allergies    Activity level - Baseline/Home:  Total Care    Activity Level - Current:   Total Care     Needed?: No    Isolation: No  Infection: Not Applicable    Bariatric?: No    Vital Signs:   Vitals:    12/08/17 1930 12/08/17 1944 12/08/17 2000 12/08/17 2030   BP: 182/88  173/90 (!) 183/105   Resp:       Temp:  99.1  F (37.3  C)     TempSrc:  Oral     SpO2: 94%      Weight:       Height:           Cardiac Rhythm: ,        Pain level: 0-10 Pain Scale: 1    Is this patient confused?: Yes    Patient Report: Initial Complaint: Olaf Fierro is a 89 year old male who presents with intermittent left arm weakness today. For 30 minutes this morning from 7 to 7:30 the staff noticed some weakness. They did not mention facial droop or difficulty speaking. The patient has had a stroke in the past. He was doing well during the day but then when his son saw him, he felt that he had trouble lifting up a glass and had weakness. Not sure exactly if this is been intermittent during the day or continuous. No headache, no falls recently. He gets around in a motor scooter or a wheelchair but does not ambulate. His speech was somewhat garbled when his son came to the nursing home this afternoon and he was more confused than normal. He has not been ill recently with fevers or chills but has had UTIs in the past and has been going more frequently. His son thought that he had a little bit of a facial droop on the left " now in his left arm was weaker. No other associated signs or symptoms  Focused Assessment: A & O x 4 but forgetful.  Hypertensive and tachycardic other VSS.  Frequently repositioned.  Emesis x 1, given zofran x 2.  Nausea still intermittently.  C/O chronic back and shoulder pain.    Tests Performed: labs, UA, ekg  Abnormal Results: UA  Treatments provided: rocephin, 1L NS, zofran x 2    Family Comments: Son @ bedside.    OBS brochure/video discussed/provided to patient: N/A    ED Medications:   Medications   lidocaine 1 % 1 mL (not administered)   lidocaine (LMX4) cream (not administered)   sodium chloride (PF) 0.9% PF flush 3 mL (not administered)   sodium chloride (PF) 0.9% PF flush 3 mL (3 mLs Intracatheter Not Given 12/8/17 1852)   0.9% sodium chloride infusion ( Intravenous New Bag 12/8/17 1842)   ondansetron (ZOFRAN) injection 4 mg (4 mg Intravenous Given 12/8/17 1902)   cefTRIAXone (ROCEPHIN) 1 g in 10 mL SWFI for IVP (1 g Intravenous Given 12/8/17 2043)   ondansetron (ZOFRAN) injection 4 mg (4 mg Intravenous Given 12/8/17 2057)       Drips infusing?:  No      ED NURSE PHONE NUMBER: [phone]

## 2017-12-09 NOTE — PROGRESS NOTES
[facility]    Hospitalist Progress Note    Assessment & Plan   Olaf Fierro is a 89 year old male who was admitted on 12/8/2017. He presents with a past medical history significant for arthritis, benign prostatic hypertrophy, cerebral infarction, CKD, GERD, hypertension and insomnia.  He was brought to the ED because he has having worsening encephalopathy.  He had several prior CVAs and there is concern this is a repeat.  The patient is able to answer basic questions but is often not felt to be fully involved in the conversation which per his family is a new change this week.  He was last seen by family normal on Wednesday, his birthday where he turned 89yo. Of note, he had been able to be mobile with a motorized scooter.  He lives in an assisted living for memory impairment but they are not skilled nursing.  His family is concerned that it has become too difficult for him to transfer safely or care for himself safely.     What I did today:    Urine culture still pending, continue antibiotics prophy.  Maikel consulted.  Repeat labs.      Plan:  Encephalopathy/Altered mental status  - Ddx includes illness (UTI) vs metabolic vs stroke  - UA was positive, started Ceftriaxone and sent culture in ED  - Discussed options with the family and given the patients wishes he would not want a CT or MRI at this point to further evaluate if this is the new CVA.  No HCT was done at this time.  It was recommended that neurology be consulted.  The family understands that any further imaging will just be diagnositic and not therapeutic as the window for treatment is narrow.    - Labs daily, antibiotics  - If 2/2 infection may improve with antibiotics     Increased weakness in Left upper extremity  - Concern possibly 3rd stroke  - Discussed if it is an infection this could cause recrudesce of his prior strokes.   - PT/OT consulted     Arthritis  - Leading to stiffness especially in lower extremities  - Defer to  PT/OT eval     Benign prostatic hypertrophy  - Continue home medications  - Able to urinate in the ED without much difficulty     Cerebral infarction x2  - Weakness and altered state could be from a new stroke or recrudesce of the old 2/2 an infection  - Neuro consulted for options.  Discussed with family TPA is not an option at this time.  Imaging was held given the family felt the patient would not want any further testing however they was some question if a UTI is not present would imaging be helpful.   - Family discussed they did not want an MRI at this time and this was discussed with Neuro.  The family understands that this is likely another stroke.   - Family is most concerned with PT and OT evaluation for possible change in placement.      CKD (Baseline 1-1.2)  - Follow Cr and dose medications renally     GERD  - Has some nausea which is treated with Zofran  - Continue omprazole     Hypertension   - Goal SBP <180  - Continue lisinopril and metoprolol     Insomnia  - Takes tylenol PM at home  - Will hold off on other sedation medications      DVT Prophylaxis: Pneumatic Compression Devices  Code Status: DNR / DNI     Disposition: Expected discharge once symptoms are addressed and pending on resolution if he will need assistance with more advanced care placement.     Vesna Ríos MD, PhD    Text Page       -Data reviewed today: I reviewed all new labs and imaging results over the last 24 hours. I personally reviewed no images or EKG's today.    Physical Exam   Temp: 98.7  F (37.1  C) Temp src: Oral BP: 129/59 Pulse: 117 Heart Rate: 117 Resp: 16 SpO2: 93 % O2 Device: None (Room air)    Vitals:    12/08/17 1555   Weight: 79.4 kg (175 lb)     Vital Signs with Ranges  Temp:  [98.3  F (36.8  C)-99.1  F (37.3  C)] 98.7  F (37.1  C)  Pulse:  [113-117] 117  Heart Rate:  [] 117  Resp:  [16] 16  BP: (129-189)/() 129/59  SpO2:  [91 %-97 %] 93 %  I/O last 3 completed shifts:  In: 937 [I.V.:937]  Out: 500  [Urine:500]    Constitutional: Awake, cooperative, no apparent distress. Moderate to severe dementia noted.  Eyes: Lids and lashes normal, sclera clear, conjunctiva normal.  ENT: Normocephalic, without obvious abnormality, atraumatic, sinuses nontender on palpation.  Very hard of hearing.  Noted skin cancer prior treated on scalp.  Respiratory: No increased work of breathing, good air exchange, clear to auscultation bilaterally, no crackles or wheezing.  Cardiovascular: Normal apical impulse, regular rate and rhythm, normal S1 and S2, no S3 or S4, and no murmur noted.  GI: No scars, normal bowel sounds, soft, non-distended, non-tender, no masses palpated, no hepatosplenomegaly.  Lymph/Hematologic: No cervical lymphadenopathy   Skin: Bruising noted on extremities but denies falls, no overt bleeding, pale skin color, no redness, warmth, or swelling, no rashes  Musculoskeletal: There is no redness, warmth, or swelling of the joints.    Neurologic: Awake, alert, oriented to name.    Neuropsychiatric: Calm, normal eye contact, alert, normal affect, oriented to self, but not place or time.  Poor memory or past and recent event recollection, abnormal thought process c/w moderate to severe dementia.        Medications     NaCl 125 mL/hr at 12/09/17 0639       sodium chloride (PF)  3 mL Intracatheter Q8H     acetaminophen (TYLENOL) tablet 1,000 mg  1,000 mg Oral TID     aspirin EC EC tablet 81 mg  81 mg Oral Daily     calcium-vitamin D  2 tablet Oral BID     lisinopril (PRINIVIL/ZESTRIL) tablet 20 mg  20 mg Oral Daily     melatonin tablet 3 mg  3 mg Oral At Bedtime     metoprolol  25 mg Oral Daily     omeprazole (priLOSEC) CR capsule 40 mg  40 mg Oral BID     sertraline  25 mg Oral Daily     tamsulosin  0.4 mg Oral Daily     cholecalciferol  1,000 Units Oral Daily     cefTRIAXone  1 g Intravenous Q24H       Data     Recent Labs  Lab 12/08/17  1604   WBC 7.3   HGB 9.9*   MCV 88         POTASSIUM 4.8   CHLORIDE  101   CO2 25   BUN 23   CR 1.19   ANIONGAP 8   GIFTY 8.6   *       Imaging:  No results found for this or any previous visit (from the past 24 hour(s)).

## 2017-12-09 NOTE — CONSULTS
[facility]    Vascular Neurology / Stroke Consultation Note     Olaf Fierro MRN# [id number]   YOB: 1920 Age: 89 year old    Code Status:DNR/DNI   Date of Admission: 12/8/2017  Date of Consult: 12/09/2017    _________________________________   Primary Care Physician   RUSTY ANDERSON  ______________________________________________         Assessment & Plan   ______________________________________________  (G93.40) Encephalopathy  (primary encounter diagnosis)  --Unclear etiology  --Will get MRI brain  -----Highly suspecting stroke  (F03.90) Dementia without behavioral disturbance, unspecified dementia type  --Baseline dementia  #. DVT Prophylaxis  --Mechanical   #. PT/OT/Speech  --Start  evaluations  #. Nutrition / GI Prophylaxis  --Per recommendations of speech therapy    #. Code Status: DNR / DNI    Would benefit from palliative care evaluation to establish goals of care    ----------------------------------------------------------------------------------  ----------------------------------------------------------------------------------  Reason for consult: I was asked by Dr. Ríos to evaluate this patient for encephalopathy.    Chief Complaint   ______________________________________________  Confusion  History is obtained from the electronic health record    History of Present Illness   ______________________________________________  89 year old male who presents with a past medical history significant for arthritis, benign prostatic hypertrophy, cerebral infarction, CKD, GERD, hypertension and insomnia.  He was brought in 12/8/17 because he has worsening encephalopathy.  He had several prior CVA and there is concern this is a recurrent event.  The patient is able to answer basic questions but is often not felt to be fully involved in the conversation which per his family is a new change this week.  He was last seen by family normal on Wednesday, his birthday where he  turned 89yo.      I saw patient in 2016 for similar spell and stroke was suggested in the setting of atrial fibrillation  He had been able to be mobile with a motorized scooter.  He lives in an assisted living for memory impairment but they are not skilled nursing and only provide help.  His family is concerned that it has become too difficult for him to transfer safely or care for himself safely.   Overnight, No changes, still with slurred speech and very disoriented  Past Medical History    ______________________________________________  Past Medical History:   Diagnosis Date     Arthritis      Benign prostatic hypertrophy      Cerebral infarction (H)      CKD (chronic kidney disease)      GERD (gastroesophageal reflux disease)      Hypertension      Insomnia      Past Surgical History   ______________________________________________  Past Surgical History:   Procedure Laterality Date     CATARACT IOL, RT/LT       ORTHOPEDIC SURGERY      right shoulder arthroplasty     ORTHOPEDIC SURGERY      lleft knee surgery     Prior to Admission Medications   ______________________________________________  Prior to Admission Medications   Prescriptions Last Dose Informant Patient Reported? Taking?   ACETAMINOPHEN PO unknown Pharmacy Yes Yes   Sig: Take 1,000 mg by mouth 3 times daily   AMOXICILLIN PO unknown Pharmacy Yes Yes   Sig: Take 2,000 mg by mouth one hour before dental appointment   ASPIRIN EC PO unknown Pharmacy Yes Yes   Sig: Take 81 mg by mouth daily   LC-4 LIDOCAINE EX unknown  Yes Yes   Sig: Externally apply topically 4 times daily as needed (pain) 4% Ointment   LISINOPRIL PO unknown Pharmacy Yes Yes   Sig: Take 10 mg by mouth daily (Patient takes 1/2 of 20 mg tablet)   MELATONIN PO unknown Pharmacy Yes Yes   Sig: Take 3 mg by mouth At Bedtime    MENTHOL-METHYL SALICYLATE EX unknown Pharmacy Yes Yes   Sig: Apply thin layer 3 times daily as needed to low back, hip and/or knee for pain. Do not use at the same time  as lidocaine ointment   Multiple Vitamins-Minerals (PRESERVISION AREDS 2) CAPS Unknown at Unknown time Pharmacy Yes No   Sig: Take 1 tablet by mouth 2 times daily   OMEPRAZOLE PO Unknown at Unknown time Pharmacy Yes No   Sig: Take 40 mg by mouth 2 times daily (Patient takes two 20mg capsules)   VITAMIN D, CHOLECALCIFEROL, PO  Pharmacy Yes No   Sig: Take 1,000 Units by mouth daily   alum & mag hydroxide-simethicone (MYLANTA ES/MAALOX  ES) 400-400-40 MG/5ML SUSP unknown Pharmacy Yes Yes   Sig: Take 15 mLs by mouth every 2 hours as needed for heartburn   calcium-vitamin D (OSCAL) 250-125 MG-UNIT TABS per tablet unknown Pharmacy Yes Yes   Sig: Take 2 tablets by mouth 2 times daily   metoprolol (LOPRESSOR) 25 MG tablet Unknown at Unknown time  No No   Sig: Take 1 tablet (25 mg) by mouth daily   oxyCODONE (ROXICODONE) 5 MG immediate release tablet Unknown at Unknown time  No No   Sig: Take 0.5-1 tablets (2.5-5 mg) by mouth 2 times daily as needed   psyllium (METAMUCIL/KONSYL) packet Unknown at Unknown time Pharmacy Yes No   Sig: Take 1 packet by mouth daily as needed for constipation   sennosides (SENOKOT) 8.6 MG tablet Unknown at Unknown time Pharmacy Yes No   Sig: Take 1 tablet by mouth daily as needed Also give 2 tabs po qd   sertraline (ZOLOFT) 25 MG tablet Unknown at Unknown time  No No   Sig: Take 1 tablet (25 mg) by mouth daily   tamsulosin (FLOMAX) 0.4 MG 24 hr capsule  Pharmacy Yes No   Sig: Take 0.4 mg by mouth daily      Facility-Administered Medications: None     Allergies   No Known Allergies    Social History   ______________________________________________  Social History     Social History     Marital status:      Spouse name: N/A     Number of children: N/A     Years of education: N/A     Social History Main Topics     Smoking status: Never Smoker     Smokeless tobacco: Not on file     Alcohol use No     Drug use: No     Sexual activity: Not on file     Other Topics Concern     Not on file  "    Social History Narrative         Family History   ______________________________________________  Reviewed and not felt to be contributory.    Review of Systems   ______________________________________________  Review of systems is not obtainable due to patient factors - confusion      Physical Exam   ______________________________________________  Weight:175 lbs 0 oz; Height:5' 6\"  Temp: 98.7  F (37.1  C) Temp src: Oral BP: 129/59 Pulse: 117 Heart Rate: 117 Resp: 16 SpO2: 93 % O2 Device: None (Room air)    General Appearance:  No acute distress  Neuro:       Mental Status Exam:   Awake, alert, disoriented X3. Speech slurred and language is minimal. Mental status is decreased       Cranial Nerves:  Pupils 3 mm, reactive. EOMI. Face sensation is normal. Face - right sided weakness.Tongue and uvula are midline. Other CN are normal           Motor:  Moves all extremities Tone and bulk are normal           Reflexes:  Normal DTR.Toes downgoing.        Sensory:  Untestable                  Coordination:   Untestable          Gait:  Untestable     Neck: no nuchal rigidity, normal thyroid. No carotid bruits.    Cardiovascular: Regular rate and rhythm, no m/r/g  Lungs: Clear to auscultation  Abdomen: Soft, not tender, not distended  Extremities: No clubbing, no cyanosis, no edema    Data   ______________________________________________  All Data personally reviewed:       Labs:   CBC RESULTS:     Recent Labs  Lab 12/09/17  0913 12/08/17  1604   WBC 8.4 7.3   RBC 2.93* 3.46*   HGB 8.4* 9.9*   HCT 25.5* 30.5*    189     Basic Metabolic Panel:   Recent Labs   Lab Test  12/09/17   0913  12/08/17   1604 02/15/16  02/07/16   1249   NA  134  134  132  135   POTASSIUM  4.1  4.8  4.0  4.5   CHLORIDE  104  101  101  101   CO2  21  25   --   26   BUN  24  23  17  26   CR  1.28*  1.19  0.97  1.20   GLC  108*  120*  93  104*   GIFTY  7.9*  8.6  8.7  8.4*     Liver panel:  Recent Labs   Lab Test  12/09/17 0913   PROTTOTAL  6.5* "   ALBUMIN  3.0*   BILITOTAL  0.3   ALKPHOS  69   AST  73*   ALT  20     INR:  Recent Labs   Lab Test  02/07/16   1249   INR  1.04      Lipid Profile:  Recent Labs   Lab Test  02/07/16   1710   CHOL  133   HDL  52   LDL  66   TRIG  73     Thyroid Panel:  Recent Labs   Lab Test  02/07/16   1710  02/07/16   1249   TSH  1.98  1.80      Vitamin B12: No lab results found.   Vitamin D level: No lab results found.  A1C: No lab results found.  Troponin I:   Recent Labs   Lab Test  02/08/16   1514  02/08/16   1107  02/08/16   0641  02/08/16   0105  02/07/16   1710  02/07/16   1249   TROPI  0.692*  0.938*  0.929*  0.166*  0.030  0.030     Ammonia: No lab results found.  CK: No lab results found.     CRP inflammation:   Recent Labs   Lab Test  02/07/16   1710   CRP  8.1*     ESR: No lab results found.    JAKE: No lab results found.    ANCA: No lab results found.   Drug Screen: No lab results found.  Alcohol level:No lab results found.  UA Results:  Recent Labs   Lab Test  12/08/17   1656   COLOR  Yellow   APPEARANCE  Clear   URINEGLC  Negative   URINEBILI  Negative   URINEKETONE  Negative   SG  1.015   UBLD  Negative   URINEPH  8.0*   PROTEIN  Negative   UROBILINOGEN  0.2   NITRITE  Negative   LEUKEST  Trace*   RBCU  O - 2   WBCU  5-10*     Most Recent 6 Bacteria Isolates From Any Culture (See EPIC Reports for Culture Details):  Recent Labs   Lab Test  12/08/17   1656   CULT  PENDING        Cardiac US:   --       Neurophysiology:   --       Imaging:   NO neuroimaging

## 2017-12-09 NOTE — PHARMACY-ADMISSION MEDICATION HISTORY
Admission medication history interview status for the 12/8/2017  admission is complete. See EPIC admission navigator for prior to admission medications     Medication history source reliability:Good    Actions taken by pharmacist (provider contacted, etc): Called [facility] and spoke with BIRGIT Lind [phone] and requested current MARs be faxed.  Lelo sent current med list but no MARs for last dose dates/times.       Additional medication history information not noted on PTA med list :None    Medication reconciliation/reorder completed by provider prior to medication history? Yes    Time spent in this activity: 20 minutes    Prior to Admission medications    Medication Sig Last Dose Taking? Auth Provider   LC-4 LIDOCAINE EX Externally apply topically 4 times daily as needed (pain) 4% Ointment unknown Yes Unknown, Entered By History   nystatin (MYCOSTATIN) cream Apply topically 2 times daily Apply to redness in groin folds/lower abdomen unknown Yes Unknown, Entered By History   Naproxen Sod-Diphenhydramine (ALEVE PM) 220-25 MG TABS Take 1 tablet by mouth At Bedtime unknown Yes Unknown, Entered By History   sennosides (SENOKOT) 8.6 MG tablet Take 2 tablets by mouth daily unknown Yes Unknown, Entered By History   sennosides (SENOKOT) 8.6 MG tablet Take by mouth daily as needed for constipation unknown Yes Unknown, Entered By History   TRAMADOL HCL PO Take 100 mg by mouth 3 times daily as needed for moderate to severe pain unknown Yes Unknown, Entered By History   sertraline (ZOLOFT) 25 MG tablet Take 1 tablet (25 mg) by mouth daily  Patient taking differently: Take 25 mg by mouth daily Takes 1/2 of a 50 mg tablet unknown Yes Kishore Rodriguez, DO   metoprolol (LOPRESSOR) 25 MG tablet Take 1 tablet (25 mg) by mouth daily  Patient taking differently: Take 25 mg by mouth daily Takes 1/2 of 50 mg Metoprolol tartrate tablet unknown Yes Kishore Rodriguez, DO   ACETAMINOPHEN PO Take 1,000 mg by  mouth 3 times daily unknown Yes Unknown, Entered By History   alum & mag hydroxide-simethicone (MYLANTA ES/MAALOX  ES) 400-400-40 MG/5ML SUSP Take 15 mLs by mouth every 2 hours as needed for heartburn unknown Yes Unknown, Entered By History   AMOXICILLIN PO Take 2,000 mg by mouth one hour before dental appointment unknown Yes Unknown, Entered By History   ASPIRIN EC PO Take 81 mg by mouth daily unknown Yes Unknown, Entered By History   calcium-vitamin D (OSCAL) 250-125 MG-UNIT TABS per tablet Take 2 tablets by mouth 2 times daily unknown Yes Unknown, Entered By History   LISINOPRIL PO Take 10 mg by mouth daily (Patient takes 1/2 of 20 mg tablet) unknown Yes Unknown, Entered By History   MENTHOL-METHYL SALICYLATE EX Apply thin layer 3 times daily as needed to low back, hip and/or knee for pain. Do not use at the same time as lidocaine ointment unknown Yes Unknown, Entered By History   Multiple Vitamins-Minerals (PRESERVISION AREDS 2) CAPS Take 1 tablet by mouth 2 times daily unknown Yes Unknown, Entered By History   OMEPRAZOLE PO Take 40 mg by mouth 2 times daily (Patient takes two 20mg capsules) unknown Yes Unknown, Entered By History   tamsulosin (FLOMAX) 0.4 MG 24 hr capsule Take 0.4 mg by mouth daily unknown Yes Unknown, Entered By History   psyllium (METAMUCIL/KONSYL) packet Take 1 packet by mouth daily as needed for constipation unknown Yes Unknown, Entered By History   MELATONIN PO Take 3 mg by mouth At Bedtime  unknown Yes Unknown, Entered By History   VITAMIN D, CHOLECALCIFEROL, PO Take 1,000 Units by mouth daily unknown Yes Unknown, Entered By History

## 2017-12-09 NOTE — PLAN OF CARE
Problem: Patient Care Overview  Goal: Plan of Care/Patient Progress Review  PT: Orders received, eval completed.  Pt admitted with increased L side weakness.  Per family and facility staff, pt resides in [facility] at [facility], receives assistance with bed mobility, can IND transfer from bed <> motorized w/c <> lift chair, but receives assist with all ADLs including dressing, bathing, toileting, medication management, meals, .    Discharge Planner PT   Patient plan for discharge: Preferably return to [facility] with increased services, but family realizes pt may need higher level of care  Current status: min A for supine > sit with HOB slightly elevated, attempted sit <> stand with max A x2 and FWW, able to unweight bottom from bed but unable to fully stand to perform transfer to chair; requires ceiling lift for OOB  Barriers to return to prior living situation: unable to transfer IND or with Ax1, decreased strength, decreased activity tolerance  Recommendations for discharge: Home with increased services and HHPT vs. TCU  Rationale for recommendations: Per discussion with family, ideally, would like to have pt return to [facility] with HHPT (and they believe this is what the pt would want) but understand that he may require higher level of care than [facility] can provide.  Discussed with family that at the current time, if pt would discharge back to his [facility], he would require mechanical lift for transfers and Ax2.  Family unsure if [facility] would be able to accommodate this.  If [facility] unable to accommodate this current level, would recommend TCU for continued strengthening and progression of IND transfers.       Entered by: Mildred Lopez 12/09/2017 2:54 PM

## 2017-12-09 NOTE — PLAN OF CARE
Problem: Patient Care Overview  Goal: Plan of Care/Patient Progress Review  Pt A&Ox2. Disoriented to place/situation. Tachy 110s. Other VSS on RA. Total. Up with a lift. Incontinent of bowel/bladder. L sided facial droop/L sided weakness from previous stroke. Neurology consult. Denies pain. Continue to monitor.

## 2017-12-09 NOTE — PLAN OF CARE
Problem: Patient Care Overview  Goal: Plan of Care/Patient Progress Review  Outcome: No Change  Oriented to self only. Alert and pleasantly confused. Turn/repo Q2, up with mechanical lift. Advanced to DD1 diet, with thickened liquids, still clears. IVF @ 125. LS clear. No Edema. 2 sons updated. Neurology consult complete. VSS, denies pain, on RA, will continue to monitor.

## 2017-12-09 NOTE — PROGRESS NOTES
12/09/17 1445   Quick Adds   Type of Visit Initial PT Evaluation   Living Environment   Lives With alone   Living Arrangements assisted living  ([facility])   Home Accessibility no concerns   Living Environment Comment Pt lives at [facility], receives assist with bed mobility and all ADLs, per facility staff and family, pt is able to transfer IND from bed <> motorized w/c <> lift chair   Self-Care   Usual Activity Tolerance fair   Current Activity Tolerance poor   Equipment Currently Used at Home wheelchair, power   Functional Level Prior   Ambulation 1-->assistive equipment  (able to operate motorized chair for mobility)   Transferring 0-->independent   Toileting 3-->assistive equipment and person   Bathing 3-->assistive equipment and person   Dressing 2-->assistive person   Eating 0-->independent   Fall history within last six months yes   Number of times patient has fallen within last six months 1   Which of the above functional risks had a recent onset or change? transferring   Prior Functional Level Comment Per family and facility staff, pt requires A for bed mobility, IND with transfers from bed <> motorized w/c, does not ambulate, receives assist with all ADLs   General Information   Onset of Illness/Injury or Date of Surgery - Date 12/08/17   Referring Physician Vesna Ríos MD   Patient/Family Goals Statement to have increased help for pt   Pertinent History of Current Problem (include personal factors and/or comorbidities that impact the POC) Pt is a 89 year old male who presents with new altered mental status worsening 12/8 AM.  The facility in where the patient lives called the family to discuss the new altered changes.   Precautions/Limitations fall precautions   General Info Comments Activity: Ambulate with assist   Cognitive Status Examination   Orientation person;place   Level of Consciousness confused   Follows Commands and Answers Questions 100% of the time   Personal Safety and Judgment  "impaired   Memory impaired   Cognitive Comment Per family and facility staff, pt is usually fully oriented   Pain Assessment   Patient Currently in Pain (c/o pain in R knee, does not rate 0-10)   Posture    Posture Forward head position;Protracted shoulders;Kyphosis   Range of Motion (ROM)   ROM Comment Due to pain, did not want to move BLE much, appears may have B knee flexion contractures, did not observe pt fully extending either leg throughout session   Strength   Strength Comments unable to fully assess as pt not wanting to move BLEs (?pain)   Bed Mobility   Bed Mobility Comments min A for supine > sit with HOB slightly elevated   Transfer Skills   Transfer Comments attempted sit <> stand with max A x2 with FWW, able to unweight bottom off bed but unable to fully stand to attempt SPT    Gait   Gait Comments did not assess, non ambulatory at baseline   Balance   Balance Comments Good static sitting balance, unable to assess standing balance 2/2 inability to fully stand   Sensory Examination   Sensory Perception Comments denies N/T   Clinical Impression   Criteria for Skilled Therapeutic Intervention evaluation only   PT Diagnosis decreased functional mobility   Influenced by the following impairments weakness, cognition, pain in R knee   Functional limitations due to impairments bed mobility, transfers   Clinical Presentation Stable/Uncomplicated   Clinical Presentation Rationale stable clinical picture this date   Clinical Decision Making (Complexity) Low complexity   Predicted Duration of Therapy Intervention (days/wks) Eval Only   Anticipated Discharge Disposition Transitional Care Facility;Home with Home Therapy   Risk & Benefits of therapy have been explained Yes   Patient, Family & other staff in agreement with plan of care Yes   [employer] AM-PAC TM \"6 Clicks\"   2016, Trustees of [employer], under license to [employer].  All rights reserved.   6 Clicks Short Forms Basic Mobility Inpatient " "Short Form   [facility] AM-PAC  \"6 Clicks\" V.2 Basic Mobility Inpatient Short Form   1. Turning from your back to your side while in a flat bed without using bedrails? 3 - A Little   2. Moving from lying on your back to sitting on the side of a flat bed without using bedrails? 3 - A Little   3. Moving to and from a bed to a chair (including a wheelchair)? 1 - Total   4. Standing up from a chair using your arms (e.g., wheelchair, or bedside chair)? 1 - Total   5. To walk in hospital room? 1 - Total   6. Climbing 3-5 steps with a railing? 1 - Total   Basic Mobility Raw Score (Score out of 24.Lower scores equate to lower levels of function) 10   Total Evaluation Time   Total Evaluation Time (Minutes) 22     "

## 2017-12-09 NOTE — PLAN OF CARE
Problem: Patient Care Overview  Goal: Plan of Care/Patient Progress Review  Discharge Planner SLP   Patient plan for discharge: Patient not able to state.   Current status: Bedside swallow evaluation completed. Patient presents with moderate oral and pharyngeal dysphagia at bedside. Patient was lethargic throughout the evaluation and only took a limited amount of PO intake. His nurse reports coughing with thin liquids at bedside. He demonstrated premature entry of thin liquids with delayed cough noted. Suspec penetration or silent aspiration. Nectar thick liquids were tolerated without overt Sx of aspiration on a small amount consumed. He only took 2 teaspoon amounts of apple sauce with no overt Sx of aspiration, but burping noted after. Patient at risk for aspiration givne his history fo CVA's, generalized weakness and JENNIFER. Recommend: 1. Dysphagia Diet Level 1 with nectar thick liquid. 2. Up in the chair for all meals and 30 -60 minutes after. No straws, small sips/bites, alternate liquids/solids every couple of bites. Hold diet if not tolerating. 3. SLP will f/u for diet tolerance and advancement as warranted.   Barriers to return to prior living situation: None  Recommendations for discharge: Back to memory care may need short term ST needs for swallowing.   Rationale for recommendations: Patient not at baseline diet.        Entered by: Teri Lucas 12/09/2017 1:21 PM

## 2017-12-09 NOTE — H&P
[facility]    History and Physical  Hospitalist       Date of Admission:  12/8/2017    Assessment & Plan   Olaf Fierro is a 89 year old male who presents with new altered mental status worsening 12/8 AM.  The facility in where the patient lives called the family to discuss the new altered changes.  He lives in a facility that specializes in memory disorders.  He lives independently within the facility but has been having a more difficult time recently with independent transfers from his motorized wheelchair.  This change is acute but it is unclear if it an illness or repeat CVA.     Plan:  Encephalopathy/Altered mental status  - Ddx includes illness (UTI) vs metabolic vs stroke  - UA was positive, started Ceftriaxone and sent culture in ED  - Discussed options with the family and given the patients wishes he would not want a CT or MRI at this point to further evaluate if this is the new CVA.  No HCT was done at this time.  It was recommended that neurology be consulted.  The family understands that any further imaging will just be diagnositic and not therapeutic as the window for treatment is narrow.    - Labs daily    Increased weakness in Left upper extremity  - Concern possibly 3rd stroke  - Discussed if it is an infection this could cause recrudesce of his prior strokes.   - PT/OT consulted    Arthritis  - Leading to stiffness especially in lower extremities  - Defer to PT/OT eval    Benign prostatic hypertrophy  - Continue home medications  - Able to urinate in the ED without much difficul    Cerebral infarction x2  - Weakness and altered state could be from a new stroke or recrudesce of the old 2/2 an infection    CKD (Baseline 1-1.2)  - Follow Cr and dose medications renally    GERD  - Has some nausea which is treated with Zofran  - Continue omprazole    Hypertension   - Goal SBP <180  - Continue lisinopril and metoprolol    Insomnia  - Takes tylenol PM at home  - Will hold off on other  sedation medications at this time given his encephalopathy but he may be able to tolerate a low dose of something    DVT Prophylaxis: Pneumatic Compression Devices  Code Status: DNR / DNI    Disposition: Expected discharge once symptoms are addressed and pending on resolution if he will need assistance with more advanced care placement.    Vesna Ríos MD, PhD    Primary Care Physician   RUSTY ANDERSON    Chief Complaint   Facility called family and they noted worsening mental status and possible increased weakness on the left upper extremity.  He was brought in for evaluation.    History is obtained from the patient, electronic health record, patient's children and patient's family    History of Present Illness   Olaf Fierro is a 89 year old male who presents with a past medical history significant for arthritis, benign prostatic hypertrophy, cerebral infarction, CKD, GERD, hypertension and insomnia.  He was brought in today because he has worsening encephalopathy.  He had several prior CVA and there is concern this is a repeat.  The patient is able to answer basic questions but is often not felt to be fully involved in the conversation which per his family is a new change this week.  He was last seen by family normal on Wednesday, his birthday where he turned 89yo.      He had been able to be mobile with a motorized scooter.  He lives in an assisted living for memory impairment but they are not skilled nursing and only provide help.  His family is concerned that it has become too difficult for him to transfer safely or care for himself safely.       Past Medical History    I have reviewed this patient's medical history and updated it with pertinent information if needed.   Past Medical History:   Diagnosis Date     Arthritis      Benign prostatic hypertrophy      Cerebral infarction (H)      CKD (chronic kidney disease)      GERD (gastroesophageal reflux disease)      Hypertension      Insomnia         Past Surgical History   I have reviewed this patient's surgical history and updated it with pertinent information if needed.  Past Surgical History:   Procedure Laterality Date     CATARACT IOL, RT/LT       ORTHOPEDIC SURGERY      right shoulder arthroplasty     ORTHOPEDIC SURGERY      lleft knee surgery       Prior to Admission Medications   Prior to Admission Medications   Prescriptions Last Dose Informant Patient Reported? Taking?   ACETAMINOPHEN PO Unknown at Unknown time Pharmacy Yes No   Sig: Take 1,000 mg by mouth 3 times daily   AMOXICILLIN PO Unknown at Unknown time Pharmacy Yes No   Sig: Take 2,000 mg by mouth one hour before dental appointment   ASPIRIN EC PO Unknown at Unknown time Pharmacy Yes No   Sig: Take 81 mg by mouth daily   LISINOPRIL PO Unknown at Unknown time Pharmacy Yes No   Sig: Take 20 mg by mouth daily (Patient takes 1/2 of 40 mg tablets)   MELATONIN PO Unknown at Unknown time Pharmacy Yes No   Sig: Take 3 mg by mouth At Bedtime    MENTHOL-METHYL SALICYLATE EX Unknown at Unknown time Pharmacy Yes No   Sig: Apply thin layer 3 times daily as needed to low back, hip and/or knee for pain. Do not use at the same time as lidocaine ointment   Multiple Vitamins-Minerals (PRESERVISION AREDS 2) CAPS Unknown at Unknown time Pharmacy Yes No   Sig: Take 1 tablet by mouth 2 times daily   OMEPRAZOLE PO Unknown at Unknown time Pharmacy Yes No   Sig: Take 40 mg by mouth 2 times daily (Patient takes two 20mg capsules)   VITAMIN D, CHOLECALCIFEROL, PO  Pharmacy Yes No   Sig: Take 1,000 Units by mouth daily   alum & mag hydroxide-simethicone (MYLANTA ES/MAALOX  ES) 400-400-40 MG/5ML SUSP Unknown at Unknown time Pharmacy Yes No   Sig: Take 15 mLs by mouth every 2 hours as needed for heartburn   calcium-vitamin D (OSCAL) 250-125 MG-UNIT TABS per tablet Unknown at Unknown time Pharmacy Yes No   Sig: Take 2 tablets by mouth 2 times daily   lidocaine (XYLOCAINE) 5 % ointment Unknown at Unknown time Pharmacy  Yes No   Sig: Apply topically as needed for moderate pain Apply moderate amount four times daily as needed for low back pain   metoprolol (LOPRESSOR) 25 MG tablet Unknown at Unknown time  No No   Sig: Take 1 tablet (25 mg) by mouth daily   oxyCODONE (ROXICODONE) 5 MG immediate release tablet Unknown at Unknown time  No No   Sig: Take 0.5-1 tablets (2.5-5 mg) by mouth 2 times daily as needed   psyllium (METAMUCIL/KONSYL) packet Unknown at Unknown time Pharmacy Yes No   Sig: Take 1 packet by mouth daily as needed for constipation   sennosides (SENOKOT) 8.6 MG tablet Unknown at Unknown time Pharmacy Yes No   Sig: Take 1 tablet by mouth daily as needed Also give 2 tabs po qd   sertraline (ZOLOFT) 25 MG tablet Unknown at Unknown time  No No   Sig: Take 1 tablet (25 mg) by mouth daily   tamsulosin (FLOMAX) 0.4 MG 24 hr capsule  Pharmacy Yes No   Sig: Take 0.4 mg by mouth daily      Facility-Administered Medications: None     Allergies   No Known Allergies    Social History   I have reviewed this patient's social history and updated it with pertinent information if needed. Olaf Fierro  reports that he has never smoked. He does not have any smokeless tobacco history on file. He reports that he does not drink alcohol or use illicit drugs.    Family History   I have reviewed this patient's family history and updated it with pertinent information if needed.   No family history on file.    Review of Systems   The 10 point Review of Systems is negative other than noted in the HPI or here. He is unable to really be articulate about his symptoms.  He does seem to feel nauseous but denies other complications.    Physical Exam   Temp: 99.1  F (37.3  C) Temp src: Oral BP: 180/81   Heart Rate: 112 Resp: 16 SpO2: 91 % O2 Device: None (Room air)    Vital Signs with Ranges  Temp:  [98.7  F (37.1  C)-99.1  F (37.3  C)] 99.1  F (37.3  C)  Heart Rate:  [] 112  Resp:  [16] 16  BP: (179-189)/(73-85) 180/81  SpO2:  [91 %-97 %] 91  %  175 lbs 0 oz    Constitutional: Awake, alert, cooperative, no apparent distress, and appears stated age.  Eyes: Lids and lashes normal, pupils equal, round and reactive to light, extra ocular muscles intact, sclera clear, conjunctiva normal.  ENT: Normocephalic, without obvious abnormality, atraumatic, sinuses nontender on palpation, external ears without lesions, oral pharynx with moist mucus membranes, tonsils without erythema or exudates, gums normal and good dentition.  Respiratory: No increased work of breathing, good air exchange, clear to auscultation bilaterally, no crackles or wheezing.  Cardiovascular: Normal apical impulse, regular rate and rhythm, normal S1 and S2, no S3 or S4, and no murmur noted.  GI: No scars, normal bowel sounds, soft, non-distended, non-tender, no masses palpated, no hepatosplenomegaly.  Lymph/Hematologic: No cervical lymphadenopathy and no supraclavicular lymphadenopathy.  Skin: Bruising noted on extremities but denies falls, no overt bleeding, pale skin color, no redness, warmth, or swelling, no rashes, no lesions, no abnormal moles, nails normal without discoloration or clubbing and no jaundice.  Musculoskeletal: There is no redness, warmth, or swelling of the joints.  Full range of motion noted.  Motor strength is 5 out of 5 all extremities bilaterally.  Tone is normal.  Neurologic: Awake, alert, oriented to name, place and time.  Cranial nerves II-XII are grossly intact.    Neuropsychiatric: Calm, normal eye contact, alert, normal affect, oriented to self, but not place or time.  Poor memory or past and recent event recollection, abnormal thought process c/w moderate to severe dementia.    Data   Data reviewed today:  I personally reviewed the EKG tracing showing normal sinus with NM prolongation. HR 91.    Recent Labs  Lab 12/08/17  1604   WBC 7.3   HGB 9.9*   MCV 88         POTASSIUM 4.8   CHLORIDE 101   CO2 25   BUN 23   CR 1.19   ANIONGAP 8   GIFTY 8.6   GLC  120*       Imaging:  No results found for this or any previous visit (from the past 24 hour(s)).

## 2017-12-09 NOTE — PROGRESS NOTES
12/09/17 1252   General Information   Onset Date 12/08/17   Start of Care Date 12/09/17   Referring Physician Dr. Ríos   Patient Profile Review/OT: Additional Occupational Profile Info See Profile for full history and prior level of function   Patient/Family Goals Statement Patient not able to state.   Swallowing Evaluation Bedside swallow evaluation   Behaviorial Observations Lethargic   Mode of current nutrition Oral diet   Type of oral diet (Clear liquids)   Respiratory Status Room air   Comments Olaf Fierro is a 89 year old male who presents with a past medical history significant for arthritis, benign prostatic hypertrophy, cerebral infarction, CKD, GERD, hypertension and insomnia.  He was brought in today because he has worsening encephalopathy.  He had several prior CVA and there is concern this is a repeat.  The patient is able to answer basic questions but is often not felt to be fully involved in the conversation which per his family is a new change this week.  He was last seen by family normal on Wednesday, his birthday where he turned 89yo.  Nurse reports immediate coughing with thin liquids.    Clinical Swallow Evaluation   Oral Musculature unable to assess due to poor participation/comprehension   Structural Abnormalities none present   Dentition present and adequate   Swallow Eval   Feeding Assistance frequent cues/help required   Clinical Swallow Eval: Thin Liquid Texture Trial   Mode of Presentation, Thin Liquids cup;self-fed   Volume of Liquid or Food Presented 4 swallows   Oral Phase of Swallow Premature pharyngeal entry   Pharyngeal Phase of Swallow impaired;reduction in laryngeal movement;repeated swallows;coughing/choking   Diagnostic Statement Delayed cough suspect penetration ot silent aspiration.    Clinical Swallow Eval: Nectar Thick Liquid Texture Trial   Mode of Presentation, Nectar cup;self-fed   Volume of Nectar Presented 3 swallows   Oral Phase, Nectar Premature pharyngeal  entry   Pharyngeal Phase, Nectar impaired;reduction in laryngeal movement;repeated swallows   Diagnostic Statement No overt Sx of aspiration.    Clinical Swallow Eval: Puree Solid Texture Trial   Mode of Presentation, Puree spoon;fed by clinician   Volume of Puree Presented 2 teaspoons   Oral Phase, Puree Poor AP movement;Premature pharyngeal entry   Pharyngeal Phase, Puree impaired;reduction in laryngeal movement;repeated swallows   Diagnostic Statement Suspect pharyngeal residue. Burping noted, has a history of GERDS.    Swallow Compensations   Swallow Compensations Alternate viscosity of consistencies;Pacing;Reduce amounts;Multiple swallow   Results Suspect silent aspiration;Oral difficulties only   Esophageal Phase of Swallow   Patient reports or presents with symptoms of esophageal dysphagia Yes   Esophageal comments GERDS   General Therapy Interventions   Planned Therapy Interventions Dysphagia Treatment   Dysphagia treatment Modified diet education   Swallow Eval: Clinical Impressions   Skilled Criteria for Therapy Intervention Skilled criteria met.  Treatment indicated.   Functional Assessment Scale (FAS) 3   Treatment Diagnosis Moderate oral and pharyngeal dysphagia   Diet texture recommendations Dysphagia diet level 1;Nectar thick liquids   Recommended Feeding/Eating Techniques alternate between small bites and sips of food/liquid;check mouth frequently for oral residue/pocketing;hard swallow w/ each bite or sip;maintain upright posture during/after eating for 30 mins;no straws;small sips/bites   Therapy Frequency 3 times/wk   Predicted Duration of Therapy Intervention (days/wks) 1 week   Anticipated Discharge Disposition extended care facility   Risks and Benefits of Treatment have been explained. Yes   Patient, family and/or staff in agreement with Plan of Care Yes   Clinical Impression Comments Patient presents with moderate oral and pharyngeal dysphagia at bedside. Patient was lethargic throughout the  evaluation and only took a limited amount of PO intake. His nurse reports coughing with thin liquids at bedside. He demonstrated premature entry of thin liquids with delayed cough noted. Suspec penetration or silent aspiration. Nectar thick liquids were tolerated without overt Sx of aspiration on a small amount consumed. He only took 2 teaspoon amounts of apple sauce with no overt Sx of aspiration, but burping noted after. Patient at risk for aspiration givne his history fo CVA's, generalized weakness and JENNIFER. Recommend: 1. Dysphagia Diet Level 1 with nectar thick liquid. 2. Up in the chair for all meals and 30 -60 minutes after. No straws, small sips/bites, alternate liquids/solids every couple of bites.    Total Evaluation Time   Total Evaluation Time (Minutes) 18

## 2017-12-10 ENCOUNTER — APPOINTMENT (OUTPATIENT)
Dept: SPEECH THERAPY | Facility: CLINIC | Age: 82
DRG: 071 | End: 2017-12-10
Payer: COMMERCIAL

## 2017-12-10 LAB
ANION GAP SERPL CALCULATED.3IONS-SCNC: 10 MMOL/L (ref 3–14)
BASOPHILS # BLD AUTO: 0 10E9/L (ref 0–0.2)
BASOPHILS NFR BLD AUTO: 0.1 %
BUN SERPL-MCNC: 27 MG/DL (ref 7–30)
CALCIUM SERPL-MCNC: 8.2 MG/DL (ref 8.5–10.1)
CHLORIDE SERPL-SCNC: 108 MMOL/L (ref 94–109)
CO2 SERPL-SCNC: 21 MMOL/L (ref 20–32)
CREAT SERPL-MCNC: 1.23 MG/DL (ref 0.66–1.25)
DIFFERENTIAL METHOD BLD: ABNORMAL
EOSINOPHIL # BLD AUTO: 0 10E9/L (ref 0–0.7)
EOSINOPHIL NFR BLD AUTO: 0 %
ERYTHROCYTE [DISTWIDTH] IN BLOOD BY AUTOMATED COUNT: 13.9 % (ref 10–15)
GFR SERPL CREATININE-BSD FRML MDRD: 54 ML/MIN/1.7M2
GLUCOSE SERPL-MCNC: 138 MG/DL (ref 70–99)
HCT VFR BLD AUTO: 26.7 % (ref 40–53)
HGB BLD-MCNC: 8.7 G/DL (ref 13.3–17.7)
IMM GRANULOCYTES # BLD: 0 10E9/L (ref 0–0.4)
IMM GRANULOCYTES NFR BLD: 0.1 %
LYMPHOCYTES # BLD AUTO: 1 10E9/L (ref 0.8–5.3)
LYMPHOCYTES NFR BLD AUTO: 13.8 %
MCH RBC QN AUTO: 28.6 PG (ref 26.5–33)
MCHC RBC AUTO-ENTMCNC: 32.6 G/DL (ref 31.5–36.5)
MCV RBC AUTO: 88 FL (ref 78–100)
MONOCYTES # BLD AUTO: 0.5 10E9/L (ref 0–1.3)
MONOCYTES NFR BLD AUTO: 6.5 %
NEUTROPHILS # BLD AUTO: 6 10E9/L (ref 1.6–8.3)
NEUTROPHILS NFR BLD AUTO: 79.5 %
NRBC # BLD AUTO: 0 10*3/UL
NRBC BLD AUTO-RTO: 0 /100
PLATELET # BLD AUTO: 126 10E9/L (ref 150–450)
POTASSIUM SERPL-SCNC: 3.6 MMOL/L (ref 3.4–5.3)
RBC # BLD AUTO: 3.04 10E12/L (ref 4.4–5.9)
SODIUM SERPL-SCNC: 139 MMOL/L (ref 133–144)
WBC # BLD AUTO: 7.5 10E9/L (ref 4–11)

## 2017-12-10 PROCEDURE — 85025 COMPLETE CBC W/AUTO DIFF WBC: CPT | Performed by: INTERNAL MEDICINE

## 2017-12-10 PROCEDURE — 36415 COLL VENOUS BLD VENIPUNCTURE: CPT | Performed by: INTERNAL MEDICINE

## 2017-12-10 PROCEDURE — 27210995 ZZH RX 272: Performed by: INTERNAL MEDICINE

## 2017-12-10 PROCEDURE — 80048 BASIC METABOLIC PNL TOTAL CA: CPT | Performed by: INTERNAL MEDICINE

## 2017-12-10 PROCEDURE — A9270 NON-COVERED ITEM OR SERVICE: HCPCS | Mod: GY | Performed by: INTERNAL MEDICINE

## 2017-12-10 PROCEDURE — 40000225 ZZH STATISTIC SLP WARD VISIT: Performed by: SPEECH-LANGUAGE PATHOLOGIST

## 2017-12-10 PROCEDURE — 99233 SBSQ HOSP IP/OBS HIGH 50: CPT | Performed by: INTERNAL MEDICINE

## 2017-12-10 PROCEDURE — 92526 ORAL FUNCTION THERAPY: CPT | Mod: GN | Performed by: SPEECH-LANGUAGE PATHOLOGIST

## 2017-12-10 PROCEDURE — 25000132 ZZH RX MED GY IP 250 OP 250 PS 637: Mod: GY | Performed by: INTERNAL MEDICINE

## 2017-12-10 PROCEDURE — 12000000 ZZH R&B MED SURG/OB

## 2017-12-10 PROCEDURE — 25000128 H RX IP 250 OP 636: Performed by: INTERNAL MEDICINE

## 2017-12-10 PROCEDURE — 25000128 H RX IP 250 OP 636: Performed by: EMERGENCY MEDICINE

## 2017-12-10 RX ADMIN — CEFTRIAXONE SODIUM 1 G: 10 INJECTION, POWDER, FOR SOLUTION INTRAVENOUS at 21:14

## 2017-12-10 RX ADMIN — MELATONIN 3 MG: 3 TAB ORAL at 21:08

## 2017-12-10 RX ADMIN — OMEPRAZOLE 40 MG: 20 CAPSULE, DELAYED RELEASE ORAL at 09:40

## 2017-12-10 RX ADMIN — ACETAMINOPHEN 1000 MG: 500 TABLET, FILM COATED ORAL at 16:11

## 2017-12-10 RX ADMIN — CALCIUM CARBONATE-CHOLECALCIFEROL TAB 250 MG-125 UNIT 2 TABLET: 250-125 TAB at 09:40

## 2017-12-10 RX ADMIN — TAMSULOSIN HYDROCHLORIDE 0.4 MG: 0.4 CAPSULE ORAL at 09:40

## 2017-12-10 RX ADMIN — SODIUM CHLORIDE: 9 INJECTION, SOLUTION INTRAVENOUS at 01:11

## 2017-12-10 RX ADMIN — VITAMIN D, TAB 1000IU (100/BT) 1000 UNITS: 25 TAB at 09:40

## 2017-12-10 RX ADMIN — ACETAMINOPHEN 1000 MG: 500 TABLET, FILM COATED ORAL at 09:40

## 2017-12-10 RX ADMIN — OMEPRAZOLE 40 MG: 20 CAPSULE, DELAYED RELEASE ORAL at 21:08

## 2017-12-10 RX ADMIN — LISINOPRIL 10 MG: 10 TABLET ORAL at 09:40

## 2017-12-10 RX ADMIN — METOPROLOL TARTRATE 25 MG: 25 TABLET ORAL at 09:40

## 2017-12-10 RX ADMIN — SERTRALINE HYDROCHLORIDE 25 MG: 25 TABLET ORAL at 09:40

## 2017-12-10 RX ADMIN — ASPIRIN 81 MG: 81 TABLET, COATED ORAL at 09:41

## 2017-12-10 RX ADMIN — CALCIUM CARBONATE-CHOLECALCIFEROL TAB 250 MG-125 UNIT 2 TABLET: 250-125 TAB at 21:08

## 2017-12-10 RX ADMIN — ACETAMINOPHEN 1000 MG: 500 TABLET, FILM COATED ORAL at 21:08

## 2017-12-10 RX ADMIN — SENNOSIDES 1 TABLET: 8.6 TABLET, FILM COATED ORAL at 13:14

## 2017-12-10 NOTE — PLAN OF CARE
Problem: Patient Care Overview  Goal: Plan of Care/Patient Progress Review  Outcome: No Change  Pt A&Ox3, disoriented to time. VSS on RA. [place]. C/o of R leg/hip pain that was relieved with repositioning. Up with a lift. turn and repo 12h. Voids with urinal, but Incontinent at times. DD1 with nectars. IVF infusing @ 125. On IV abx. Continue to monitor.

## 2017-12-10 NOTE — PROGRESS NOTES
[facility]    Hospitalist Progress Note    Assessment & Plan   Olaf Fierro is a 89 year old male who was admitted on 12/8/2017. He presented with a past medical history significant for arthritis, benign prostatic hypertrophy, cerebral infarction x2 with paroxsymal atrial fibrillation, CKD, GERD, hypertension and insomnia.  He was brought to the ED because he has having worsening encephalopathy.  He had several prior CVAs and there is concern this is another CVA.  The patient is able to answer basic questions but is often not felt to be fully involved in the conversation which per his family is a new change this week.  He was last seen by family normal on Wednesday, his birthday where he turned 89yo. Of note, he had been able to be mobile with a motorized scooter.  He lives in an assisted living for memory impairment but they are not skilled nursing.  His family is concerned that it has become too difficult for him to transfer safely or care for himself safely.     Today:    Urine culture positive for 10,000 to 50,000 colonies/mL, Coagulase negative Staphylococcus and Susceptibility testing in progress.  We will continue IV antibiotics.  His mental status is much improved today and he is very aware of his surroundings.      Plan:  Encephalopathy/Altered mental status- resolving  - Ddx includes illness (Coag negative Staph UTI) vs stroke  - Continue IV Ceftriaxone   - For stroke work up neuro was consulted and options were discussed with the family.  Given the patients wishes he would not want a CT or MRI at this point to further evaluate if this is the new CVA.  No HCT was done at this time.   - Labs daily, antibiotics    Coagulase Negative Straph UTI  - Continue with Ceftriaxone IV until susceptibility testing done  - AMS improved     Increased weakness in Left upper extremity  - Concern possibly secondary to new TIA/CVA  - PT/OT consulted and recommend increased services from what he is  [facility] receiving at [facility]    Cerebral infarction x2, concern for new CVA/TIA  - Weakness from prior CVAs  - Discussed with family TPA is not an option at this time.    - Family discussed they did not want an MRI at this time and this was discussed with Neuro.  The family understands that this is likely another stroke.   - Family is most concerned with PT and OT evaluation for possible change in placement.      Arthritis  - Leading to stiffness especially in lower extremities  - Defer to PT/OT eval     Benign prostatic hypertrophy  - Continue home medications     CKD (Baseline 1-1.2)  - Follow Cr and dose medications renally     GERD  - Continue omprazole     Hypertension   - Goal SBP <180  - Continue lisinopril and metoprolol     Insomnia  - Takes tylenol PM at home     DVT Prophylaxis: Pneumatic Compression Devices  Code Status: DNR / DNI     Disposition: Expected discharge once symptoms are addressed and pending on resolution if he will need assistance with more advanced care placement.     Vesna Ríos MD, PhD    Text Page       -Data reviewed today: I reviewed all new labs and imaging results over the last 24 hours. I personally reviewed no images or EKG's today.    Physical Exam   Temp: 97.4  F (36.3  C) Temp src: Oral BP: 126/59 Pulse: 75 Heart Rate: 65 Resp: 16 SpO2: 99 % O2 Device: None (Room air)    Vitals:    12/08/17 1555   Weight: 79.4 kg (175 lb)     Vital Signs with Ranges  Temp:  [97.4  F (36.3  C)-98.5  F (36.9  C)] 97.4  F (36.3  C)  Pulse:  [71-79] 75  Heart Rate:  [65-77] 65  Resp:  [16] 16  BP: (100-126)/(38-59) 126/59  SpO2:  [94 %-99 %] 99 %  I/O last 3 completed shifts:  In: 280 [P.O.:280]  Out: 850 [Urine:850]    Constitutional: Awake, cooperative, no apparent distress. Moderate to severe dementia noted.  Eyes: Lids and lashes normal, sclera clear, conjunctiva normal.  ENT: Normocephalic, without obvious abnormality, atraumatic, sinuses nontender on palpation.  Very hard of hearing.   Noted skin cancer prior treated on scalp.  Respiratory: No increased work of breathing, good air exchange, clear to auscultation bilaterally, no crackles or wheezing.  Cardiovascular: Normal apical impulse, regular rate and rhythm, normal S1 and S2, no S3 or S4, and no murmur noted.  GI: No scars, normal bowel sounds, soft, non-distended, non-tender, no masses palpated, no hepatosplenomegaly.  Lymph/Hematologic: No cervical lymphadenopathy   Skin: Bruising noted on extremities but denies falls, no overt bleeding, pale skin color, no redness, warmth, or swelling, no rashes  Musculoskeletal: There is no redness, warmth, or swelling of the joints.    Neurologic: Awake, alert, oriented to name.    Neuropsychiatric: Calm, normal eye contact, alert, normal affect, oriented to self, but not place or time.  Poor memory or past and recent event recollection, abnormal thought process c/w moderate to severe dementia.        Medications        lisinopril (PRINIVIL/ZESTRIL) tablet 10 mg  10 mg Oral Daily     sodium chloride (PF)  3 mL Intracatheter Q8H     acetaminophen (TYLENOL) tablet 1,000 mg  1,000 mg Oral TID     aspirin EC EC tablet 81 mg  81 mg Oral Daily     calcium-vitamin D  2 tablet Oral BID     melatonin tablet 3 mg  3 mg Oral At Bedtime     metoprolol  25 mg Oral Daily     omeprazole (priLOSEC) CR capsule 40 mg  40 mg Oral BID     sertraline  25 mg Oral Daily     tamsulosin  0.4 mg Oral Daily     cholecalciferol  1,000 Units Oral Daily     cefTRIAXone  1 g Intravenous Q24H       Data     Recent Labs  Lab 12/10/17  1000 12/09/17  0913 12/08/17  1604   WBC 7.5 8.4 7.3   HGB 8.7* 8.4* 9.9*   MCV 88 87 88   * 159 189    134 134   POTASSIUM 3.6 4.1 4.8   CHLORIDE 108 104 101   CO2 21 21 25   BUN 27 24 23   CR 1.23 1.28* 1.19   ANIONGAP 10 9 8   GIFTY 8.2* 7.9* 8.6   * 108* 120*   ALBUMIN  --  3.0*  --    PROTTOTAL  --  6.5*  --    BILITOTAL  --  0.3  --    ALKPHOS  --  69  --    ALT  --  20  --    AST   --  73*  --        Imaging:  No results found for this or any previous visit (from the past 24 hour(s)).

## 2017-12-10 NOTE — PLAN OF CARE
Problem: Patient Care Overview  Goal: Plan of Care/Patient Progress Review  Outcome: Improving  A+Ox3 forgetful at times. Orientation greatly improved from 12/9. Up with mechanical lift to chairs for meals. HA to right ear. DD1 nectar thick liquids. Urine culture abnormal. LS clear. VSS, on RA, denies pain, will continue to monitor.

## 2017-12-10 NOTE — PLAN OF CARE
Problem: Patient Care Overview  Goal: Plan of Care/Patient Progress Review  Discharge Planner SLP   Patient plan for discharge: Patient did not state.  Current status:  Patient was seen for swallow treatment while up in the chair with his son's present. Patient was alert today and able to carry on a conversation. He was able to tolerate trials of nectar thick liquids via the cup without overt Sx of aspiration. Tolerated pureed textures without difficulty. Trial of semi-soft textures given. Mildly prolonged mastication with minimal to mild oral residue noted. Cued to use 2 swallows and alternate with a liquids. No overt Sx of aspiration on snack given. Patient reported not liking the pureed diet. Recommend: 1. Advance diet to a Dysphagia Diet level 2 and continue with the nectar thick liquids. 2. Up in a chair for all meals, feed only when fully alert, small bites/sips, double swallow and alternate liquids/solids. Hold diet if Sx of aspiration present with a meals. 3. SLP will f/u for diet tolerance and swallow strategies.   Barriers to return to prior living situation: Deconditioning.  Recommendations for discharge: TCU  Rationale for recommendations: Continue ST for swallowing at the TCU per son's is not at baseline diet.        Entered by: Teri Lucas 12/10/2017 3:00 PM

## 2017-12-10 NOTE — PROGRESS NOTES
SP[facility]UAL [facility] Progress Note  [facility]    D:  made referral for pastoral visit with hospital  per request.     P: No plans to follow.     Tracy Annlain Intern

## 2017-12-10 NOTE — PLAN OF CARE
Problem: Patient Care Overview  Goal: Plan of Care/Patient Progress Review  Pt is alert, oriented to self only. Calm and cooperative for cares. [place], confused at times. VSS on RA. Denied pain. Lung sound clear to auscultation. BS+ and  DD1 diet with NT with fair appetite. Up x 2 assist with lift. Turn and reposition Q 2 hrs. Using urinal and voiding adequately, No BM on this shift. Receiving IVF running at 125/hr. On IV abx. Neurology following. Discharge pending in progress. Will continue to monitor.

## 2017-12-10 NOTE — PROGRESS NOTES
SW  D: Per run the list this writer contacted both of pt.'s sons and left msgs and then both returned the call. It was decided to proceed with a TCU transport since it is unlikely that [facility] will be able to take pt with a lift. The TCU choices of [facility] and [facility] were provided. This writer will begin the referral process and plans to meet with family later today in pt.'s room.  P:  will follow to assist with d/c to TCU.    Met with pt.'s family and pt.'s hospitalist to further discuss d/c and pt.'s son Lasha plans to call the [facility] tomorrow to see if they are able to authorize TCU stay since pt may not meet the three day Medicare rule to have the rehab benefit. This writer also talked about the possibility of a TCU stay being private pay, but this writer was unable to get ahold of admissions at [facility] to get the private pay details. Referral was made to [facility] as pt will likely be ready for d/c on 12/11 or 12/12. Discussed both TCU and the possibility of pt needing LTC if not now in the future. Pt.'s family is very realistic and helpful.   will continue to follow and update pt/family as appropriate regarding a TCU bed and d/c.

## 2017-12-11 ENCOUNTER — APPOINTMENT (OUTPATIENT)
Dept: SPEECH THERAPY | Facility: CLINIC | Age: 82
DRG: 071 | End: 2017-12-11
Payer: COMMERCIAL

## 2017-12-11 PROCEDURE — A9270 NON-COVERED ITEM OR SERVICE: HCPCS | Mod: GY | Performed by: INTERNAL MEDICINE

## 2017-12-11 PROCEDURE — 12000000 ZZH R&B MED SURG/OB

## 2017-12-11 PROCEDURE — 99233 SBSQ HOSP IP/OBS HIGH 50: CPT | Performed by: INTERNAL MEDICINE

## 2017-12-11 PROCEDURE — 40000225 ZZH STATISTIC SLP WARD VISIT: Performed by: SPEECH-LANGUAGE PATHOLOGIST

## 2017-12-11 PROCEDURE — 25000128 H RX IP 250 OP 636: Performed by: INTERNAL MEDICINE

## 2017-12-11 PROCEDURE — 25000132 ZZH RX MED GY IP 250 OP 250 PS 637: Mod: GY | Performed by: INTERNAL MEDICINE

## 2017-12-11 PROCEDURE — 27210995 ZZH RX 272: Performed by: INTERNAL MEDICINE

## 2017-12-11 PROCEDURE — 92526 ORAL FUNCTION THERAPY: CPT | Mod: GN | Performed by: SPEECH-LANGUAGE PATHOLOGIST

## 2017-12-11 RX ORDER — TRAMADOL HYDROCHLORIDE 50 MG/1
100 TABLET ORAL 3 TIMES DAILY PRN
Status: DISCONTINUED | OUTPATIENT
Start: 2017-12-11 | End: 2017-12-12 | Stop reason: HOSPADM

## 2017-12-11 RX ADMIN — CALCIUM CARBONATE-CHOLECALCIFEROL TAB 250 MG-125 UNIT 2 TABLET: 250-125 TAB at 08:33

## 2017-12-11 RX ADMIN — VITAMIN D, TAB 1000IU (100/BT) 1000 UNITS: 25 TAB at 08:33

## 2017-12-11 RX ADMIN — ASPIRIN 81 MG: 81 TABLET, COATED ORAL at 08:32

## 2017-12-11 RX ADMIN — METOPROLOL TARTRATE 25 MG: 25 TABLET ORAL at 08:33

## 2017-12-11 RX ADMIN — CALCIUM CARBONATE-CHOLECALCIFEROL TAB 250 MG-125 UNIT 2 TABLET: 250-125 TAB at 20:39

## 2017-12-11 RX ADMIN — OMEPRAZOLE 40 MG: 20 CAPSULE, DELAYED RELEASE ORAL at 20:38

## 2017-12-11 RX ADMIN — SERTRALINE HYDROCHLORIDE 25 MG: 25 TABLET ORAL at 08:33

## 2017-12-11 RX ADMIN — TRAMADOL HYDROCHLORIDE 100 MG: 50 TABLET, COATED ORAL at 11:59

## 2017-12-11 RX ADMIN — LISINOPRIL 10 MG: 10 TABLET ORAL at 08:33

## 2017-12-11 RX ADMIN — ACETAMINOPHEN 1000 MG: 500 TABLET, FILM COATED ORAL at 08:32

## 2017-12-11 RX ADMIN — TRAMADOL HYDROCHLORIDE 100 MG: 50 TABLET, COATED ORAL at 20:39

## 2017-12-11 RX ADMIN — OMEPRAZOLE 40 MG: 20 CAPSULE, DELAYED RELEASE ORAL at 08:32

## 2017-12-11 RX ADMIN — MELATONIN 3 MG: 3 TAB ORAL at 20:39

## 2017-12-11 RX ADMIN — CEFTRIAXONE SODIUM 1 G: 10 INJECTION, POWDER, FOR SOLUTION INTRAVENOUS at 21:30

## 2017-12-11 RX ADMIN — TAMSULOSIN HYDROCHLORIDE 0.4 MG: 0.4 CAPSULE ORAL at 08:33

## 2017-12-11 RX ADMIN — ACETAMINOPHEN 1000 MG: 500 TABLET, FILM COATED ORAL at 20:38

## 2017-12-11 RX ADMIN — ACETAMINOPHEN 1000 MG: 500 TABLET, FILM COATED ORAL at 15:59

## 2017-12-11 NOTE — PROGRESS NOTES
GEETA  D) Following for D/C planning.  I) Received a call from REBECCA Ruth with the [facility] ([phone] and fax [phone]). Flory reports patient has a service connected disability and would have TCU coverage under the [facility]. She asks for the history and Physical and D/C summary to be faxed upon discharge. She states Daria at [phone] must next be contacted to confirm eligibility and to provide a current list of VA contracted SNFs. A message was left for Daria.  P) Will send new referrals to VA facilities once  confirmation received.    Addendum  Son, Eliseo asked to meet with GEETA. Updated him of the process as noted above. He asks that a referral be sent to [facility], as this is believed to be a VA contracted SNF. Patient was there one year ago, he reports.  Referral sent via the DOD process.    Update: [facility] can accept patient on 12/12.

## 2017-12-11 NOTE — PLAN OF CARE
Problem: Patient Care Overview  Goal: Plan of Care/Patient Progress Review  Discharge Planner SLP   Patient plan for discharge: Patient did not state.   Current status: SLP: Patient seen for swallow treatment while up in the chair. Patient staed he does not like the thick liquids. His nurse states he is not drinking them. Trials of ice chips and swallows of water via the cup given. Patient with premature entry but no overt Sx of aspiration on 3 oz of water. Education provided on increased risk for aspiration on thin with meals. He verblaized understanding and would like thin liquids. He is DNR/DNI and given his age and poor intake of thick liquids is also at risk for dehydration. Recommend: 1. Continue on the DDL 2 and advance to thin liquids. 2. Up in a chair for all meals, no straws, small bites/sips, alternate liquids/solids. 3. SLP will f/u for diet tolerance and swallow strategies.    Barriers to return to prior living situation: Deconditioning.  Recommendations for discharge: TCU  Rationale for recommendations: Continue ST for short term at the TCU to advance diet as tolerated.        Entered by: Teri Lucas 12/11/2017 11:21 AM

## 2017-12-11 NOTE — PLAN OF CARE
Problem: Patient Care Overview  Goal: Plan of Care/Patient Progress Review  Pt is A&Ox3, disoriented to time. Calm and pleasant, [place] to right ear and  forgetful at times. VSS on RA, denied pain/SOB. Lung sound clear to auscultation.  DD2 with nectar thick liquid diet and tolerating well.  Up with mechanical lift to chairs for meals. Voiding adequately and had large formed BM on this shift. Urine culture abnormal and receiving IV abx. Discharge pending in progress.  will continue to monitor.

## 2017-12-11 NOTE — PLAN OF CARE
Problem: Patient Care Overview  Goal: Plan of Care/Patient Progress Review  Outcome: Improving  VSS. A/O, [place]. Pt c/o of hip/shoulder pain. Scheduled Tylenol given. Still having hip pain, Home tramadol restarted. Relief noted. Up with lift. In chair for meals. Advanced to thin liquids, tolerating well.  Tolerating DD2 diet. Speech following. Voiding urinal +incontinent x1 Small incontinent BM. D/C to TCU pending placement. Family here and updated.

## 2017-12-11 NOTE — PLAN OF CARE
Problem: Patient Care Overview  Goal: Plan of Care/Patient Progress Review  Outcome: No Change  Pt A&Ox4. VSS on RA. C/o mild hip pain, relief with reposition. Up with lift. Turn and repo q2h. Voiding in urinal. DD2 with nectars. [place] to right ear. LS clear, denied SOB. IV abx. Possible discharge to TCU pending placement. SLP following. Continue to monitor.

## 2017-12-11 NOTE — PROVIDER NOTIFICATION
Patient is requesting tramadol for pain. States he takes this at home. Dr. Ríos paged with request.

## 2017-12-11 NOTE — PROGRESS NOTES
[facility]    Hospitalist Progress Note    Assessment & Plan   Olaf Fierro is a 89 year old male who was admitted on 12/8/2017. He presented with a past medical history significant for arthritis, benign prostatic hypertrophy, cerebral infarction x2 with paroxsymal atrial fibrillation, CKD, GERD, hypertension and insomnia.  He was brought to the ED because he has having worsening encephalopathy.  He had several prior CVAs and there is concern this is another CVA.  The patient is able to answer basic questions but is often not felt to be fully involved in the conversation which per his family is a new change this week.  He was last seen by family normal on Wednesday, his birthday where he turned 89yo. Of note, he had been able to be mobile with a motorized scooter.  He lives in an assisted living for memory impairment but they are not skilled nursing.  His family is concerned that it has become too difficult for him to transfer safely or care for himself safely.     Note:  Urine culture positive for 10,000 to 50,000 colonies/mL, Coagulase negative Staphylococcus and Susceptibility testing in progress.  We will continue IV antibiotics.  His mental status is much improved.    Plan:  Encephalopathy/Altered mental status- resolving  - Ddx includes illness (Coag negative Staph UTI) vs stroke  - Continue IV Ceftriaxone   - For stroke work up neuro was consulted and options were discussed with the family.  Given the patients wishes he would not want a CT or MRI at this point to further evaluate if this is the new CVA.  No HCT was done at this time.     Coagulase Negative Staph UTI, treat 12/8-12/15  - Continue with Ceftriaxone IV until susceptibility testing done  - Oral susceptibility is limited.  Prefer not to use nitrofurantoin or gentamicin in the elderly.  - Called [facility] lab ([phone]) and asked the lab to run susceptibility to Ceftriaxone, Cefpodoxime and Bactrim.  Will start testing  12/12.     Increased weakness in Left upper extremity  - Concern possibly secondary to new TIA/CVA  - PT/OT consulted and recommend increased services from what he is currently receiving at [facility]    Cerebral infarction x2, concern for new CVA/TIA  - Weakness from prior CVAs  - Discussed with family TPA is not an option at this time.    - Family discussed they did not want an MRI at this time and this was discussed with Neuro.  The family understands that this is possibly another stroke.   - Family is most concerned with PT and OT evaluation for possible change in placement.      Arthritis/Hip pain  - Restarted tramadol 12/11  - Defer to PT/OT eval     Benign prostatic hypertrophy  - Continue home medications     CKD (Baseline 1-1.2)  - Follow Cr and dose medications renally     GERD  - Continue omprazole     Hypertension   - Goal SBP <180  - Continue lisinopril and metoprolol     Insomnia  - Takes tylenol PM at home     DVT Prophylaxis: Pneumatic Compression Devices  Code Status: DNR / DNI     Disposition: Expected discharge once PO antibiotic is identified for discharge.  Placement with TCU via [facility] coverage- appreciate SW help.    Vesna Ríos MD, PhD  Text Page       -Data reviewed today: I reviewed all new labs and imaging results over the last 24 hours. I personally reviewed no images or EKG's today.    Physical Exam   Temp: 97.5  F (36.4  C) Temp src: Oral BP: 150/77   Heart Rate: 69 Resp: 16 SpO2: 96 % O2 Device: None (Room air)    Vitals:    12/08/17 1555   Weight: 79.4 kg (175 lb)     Vital Signs with Ranges  Temp:  [97.5  F (36.4  C)-98.9  F (37.2  C)] 97.5  F (36.4  C)  Heart Rate:  [69-86] 69  Resp:  [16] 16  BP: (133-150)/(53-77) 150/77  SpO2:  [94 %-96 %] 96 %  I/O last 3 completed shifts:  In: 820 [P.O.:820]  Out: 550 [Urine:550]    Constitutional: Awake, cooperative, no apparent distress. Moderate to severe dementia noted.  Eyes: Lids and lashes normal, sclera clear, conjunctiva normal.  ENT:  Normocephalic, without obvious abnormality, atraumatic, sinuses nontender on palpation.  Very hard of hearing.  Noted skin cancer prior treated on scalp.  Respiratory: No increased work of breathing, good air exchange, clear to auscultation bilaterally, no crackles or wheezing.  Cardiovascular: Normal apical impulse, regular rate and rhythm, normal S1 and S2, no S3 or S4, and no murmur noted.  GI: No scars, normal bowel sounds, soft, non-distended, non-tender, no masses palpated, no hepatosplenomegaly.  Lymph/Hematologic: No cervical lymphadenopathy   Skin: Bruising noted on extremities but denies falls, no overt bleeding, pale skin color, no redness, warmth, or swelling, no rashes  Musculoskeletal: There is no redness, warmth, or swelling of the joints.    Neurologic: Awake, alert, oriented to name.    Neuropsychiatric: Calm, normal eye contact, alert, normal affect, oriented to self, but not place or time.  Poor memory or past and recent event recollection, abnormal thought process c/w moderate to severe dementia.        Medications        lisinopril (PRINIVIL/ZESTRIL) tablet 10 mg  10 mg Oral Daily     sodium chloride (PF)  3 mL Intracatheter Q8H     acetaminophen (TYLENOL) tablet 1,000 mg  1,000 mg Oral TID     aspirin EC EC tablet 81 mg  81 mg Oral Daily     calcium-vitamin D  2 tablet Oral BID     melatonin tablet 3 mg  3 mg Oral At Bedtime     metoprolol  25 mg Oral Daily     omeprazole (priLOSEC) CR capsule 40 mg  40 mg Oral BID     sertraline  25 mg Oral Daily     tamsulosin  0.4 mg Oral Daily     cholecalciferol  1,000 Units Oral Daily     cefTRIAXone  1 g Intravenous Q24H       Data     Recent Labs  Lab 12/10/17  1000 12/09/17  0913 12/08/17  1604   WBC 7.5 8.4 7.3   HGB 8.7* 8.4* 9.9*   MCV 88 87 88   * 159 189    134 134   POTASSIUM 3.6 4.1 4.8   CHLORIDE 108 104 101   CO2 21 21 25   BUN 27 24 23   CR 1.23 1.28* 1.19   ANIONGAP 10 9 8   GIFTY 8.2* 7.9* 8.6   * 108* 120*   ALBUMIN  --   3.0*  --    PROTTOTAL  --  6.5*  --    BILITOTAL  --  0.3  --    ALKPHOS  --  69  --    ALT  --  20  --    AST  --  73*  --        Imaging:  No results found for this or any previous visit (from the past 24 hour(s)).

## 2017-12-12 ENCOUNTER — APPOINTMENT (OUTPATIENT)
Dept: SPEECH THERAPY | Facility: CLINIC | Age: 82
DRG: 071 | End: 2017-12-12
Payer: COMMERCIAL

## 2017-12-12 VITALS
SYSTOLIC BLOOD PRESSURE: 148 MMHG | RESPIRATION RATE: 16 BRPM | HEIGHT: 66 IN | TEMPERATURE: 98 F | OXYGEN SATURATION: 94 % | DIASTOLIC BLOOD PRESSURE: 62 MMHG | HEART RATE: 75 BPM | BODY MASS INDEX: 28.12 KG/M2 | WEIGHT: 175 LBS

## 2017-12-12 LAB
ANION GAP SERPL CALCULATED.3IONS-SCNC: 10 MMOL/L (ref 3–14)
BACTERIA SPEC CULT: ABNORMAL
BUN SERPL-MCNC: 20 MG/DL (ref 7–30)
CALCIUM SERPL-MCNC: 8.1 MG/DL (ref 8.5–10.1)
CHLORIDE SERPL-SCNC: 104 MMOL/L (ref 94–109)
CO2 SERPL-SCNC: 22 MMOL/L (ref 20–32)
CREAT SERPL-MCNC: 1.01 MG/DL (ref 0.66–1.25)
ERYTHROCYTE [DISTWIDTH] IN BLOOD BY AUTOMATED COUNT: 14 % (ref 10–15)
GFR SERPL CREATININE-BSD FRML MDRD: 68 ML/MIN/1.7M2
GLUCOSE SERPL-MCNC: 99 MG/DL (ref 70–99)
HCT VFR BLD AUTO: 25.9 % (ref 40–53)
HGB BLD-MCNC: 8.6 G/DL (ref 13.3–17.7)
Lab: ABNORMAL
MCH RBC QN AUTO: 29.1 PG (ref 26.5–33)
MCHC RBC AUTO-ENTMCNC: 33.2 G/DL (ref 31.5–36.5)
MCV RBC AUTO: 88 FL (ref 78–100)
PLATELET # BLD AUTO: 127 10E9/L (ref 150–450)
POTASSIUM SERPL-SCNC: 3.4 MMOL/L (ref 3.4–5.3)
RBC # BLD AUTO: 2.96 10E12/L (ref 4.4–5.9)
SODIUM SERPL-SCNC: 136 MMOL/L (ref 133–144)
SPECIMEN SOURCE: ABNORMAL
WBC # BLD AUTO: 8.1 10E9/L (ref 4–11)

## 2017-12-12 PROCEDURE — A9270 NON-COVERED ITEM OR SERVICE: HCPCS | Mod: GY | Performed by: INTERNAL MEDICINE

## 2017-12-12 PROCEDURE — 40000225 ZZH STATISTIC SLP WARD VISIT: Performed by: SPEECH-LANGUAGE PATHOLOGIST

## 2017-12-12 PROCEDURE — 36415 COLL VENOUS BLD VENIPUNCTURE: CPT | Performed by: INTERNAL MEDICINE

## 2017-12-12 PROCEDURE — 99239 HOSP IP/OBS DSCHRG MGMT >30: CPT | Performed by: INTERNAL MEDICINE

## 2017-12-12 PROCEDURE — 80048 BASIC METABOLIC PNL TOTAL CA: CPT | Performed by: INTERNAL MEDICINE

## 2017-12-12 PROCEDURE — 25000132 ZZH RX MED GY IP 250 OP 250 PS 637: Mod: GY | Performed by: INTERNAL MEDICINE

## 2017-12-12 PROCEDURE — 92526 ORAL FUNCTION THERAPY: CPT | Mod: GN | Performed by: SPEECH-LANGUAGE PATHOLOGIST

## 2017-12-12 PROCEDURE — 85027 COMPLETE CBC AUTOMATED: CPT | Performed by: INTERNAL MEDICINE

## 2017-12-12 RX ORDER — TRAMADOL HYDROCHLORIDE 50 MG/1
100 TABLET ORAL 3 TIMES DAILY PRN
Qty: 30 TABLET | Refills: 0 | Status: SHIPPED | OUTPATIENT
Start: 2017-12-12 | End: 2017-12-18 | Stop reason: DRUGHIGH

## 2017-12-12 RX ORDER — CEPHALEXIN 500 MG/1
500 CAPSULE ORAL 3 TIMES DAILY
Qty: 15 CAPSULE | Refills: 0 | DISCHARGE
Start: 2017-12-12 | End: 2017-12-17

## 2017-12-12 RX ADMIN — OMEPRAZOLE 40 MG: 20 CAPSULE, DELAYED RELEASE ORAL at 09:03

## 2017-12-12 RX ADMIN — TAMSULOSIN HYDROCHLORIDE 0.4 MG: 0.4 CAPSULE ORAL at 09:02

## 2017-12-12 RX ADMIN — ACETAMINOPHEN 650 MG: 325 TABLET, FILM COATED ORAL at 01:19

## 2017-12-12 RX ADMIN — VITAMIN D, TAB 1000IU (100/BT) 1000 UNITS: 25 TAB at 09:02

## 2017-12-12 RX ADMIN — LISINOPRIL 10 MG: 10 TABLET ORAL at 09:03

## 2017-12-12 RX ADMIN — ASPIRIN 81 MG: 81 TABLET, COATED ORAL at 09:03

## 2017-12-12 RX ADMIN — SERTRALINE HYDROCHLORIDE 25 MG: 25 TABLET ORAL at 09:02

## 2017-12-12 RX ADMIN — CALCIUM CARBONATE-CHOLECALCIFEROL TAB 250 MG-125 UNIT 2 TABLET: 250-125 TAB at 09:03

## 2017-12-12 RX ADMIN — ACETAMINOPHEN 1000 MG: 500 TABLET, FILM COATED ORAL at 09:02

## 2017-12-12 RX ADMIN — METOPROLOL TARTRATE 25 MG: 25 TABLET ORAL at 09:02

## 2017-12-12 NOTE — PLAN OF CARE
Problem: Patient Care Overview  Goal: Plan of Care/Patient Progress Review  Outcome: Improving  AOx3,disoriented to time, [place], R hearing aid. Tolerating DD2 with thin liquid diet. Up in chair for dinner with lift. Uses urinal, incont at times. Ultram and tyl for bilat knee and shoulder pain.  [facility] pending placement.

## 2017-12-12 NOTE — PLAN OF CARE
Problem: Patient Care Overview  Goal: Plan of Care/Patient Progress Review  Outcome: Improving  Pt. A&Ox3. [place] has Rt hearing aid. VSS on RA. Tylenol effective for generalized pain. Voiding adequately. T&R q2hrs. Assist of 2. D/c pending placement

## 2017-12-12 NOTE — PLAN OF CARE
Problem: Patient Care Overview  Goal: Plan of Care/Patient Progress Review  Outcome: Adequate for Discharge Date Met: 12/12/17  Pt transferred to TCU for rehab. Family at bedside,given dc instructions. Up with ceiling lift to delmi garcia.

## 2017-12-12 NOTE — DISCHARGE SUMMARY
[facility]    Discharge Summary  Hospitalist    Date of Admission:  12/8/2017  Date of Discharge:  12/12/2017  Discharging Provider: Lee Gold MD    Discharge Diagnoses     Acute metabolic encephalopathy  Coagulase Negative Staph UTI   Possible acute CVA   Arthritis/Hip pain  Benign prostatic hypertrophy  CKD-stage III  GERD  Hypertension       Hospital Course   Olaf Fierro is a 89 year old male who was admitted on 12/8/2017. He presented with a past medical history significant for arthritis, benign prostatic hypertrophy, cerebral infarction x2 with paroxsymal atrial fibrillation, CKD, GERD, hypertension and insomnia.  He was brought to the ED because he has having worsening encephalopathy.  He had several prior CVAs and there was concern this is another CVA.  The patient was able to answer basic questions but was often not felt to be fully involved in the conversation which per his family is a new change this week.  He was last seen by family normal on Wednesday, his birthday where he turned 89. Of note, he had been able to be mobile with a motorized scooter.       Acute metabolic encephalopathy  suspected acute CVA, history of multiple CVAs  Coagulase Negative Staph UTI  -the most likely cause of acute encephalopathy was UTI versus possible stroke  -UTI was initially treated with IV antibiotics, based on sensitivities, changed to Keflex, discussed with Dr. Garrison; infectious disease on the day of discharge.  -Neurology was consulted with concern for acute CVA however family did not want to pursue any further workup regarding that.  -family understood that this is possibly another stroke.   -Family most concerned with PT and OT evaluation for possible change in placement.   -Patient will be discharged to TCU  -by the day of discharge encephalopathy was improving        Lee Gold MD    Significant Results and Procedures     Pending Results     Unresulted Labs Ordered in the Past  30 Days of this Admission     No orders found from 10/9/2017 to 12/9/2017.          Code Status   DNR / DNI       Primary Care Physician   RUSTY ANDERSON    Physical Exam   Temp: 98  F (36.7  C) Temp src: Oral BP: 148/62   Heart Rate: 84 Resp: 16 SpO2: 94 % O2 Device: None (Room air)      Constitutional: AAOX2; answering most simple questions appropriately,NAD  Neck- Supple, Good ROM, No JVD  Respiratory: CTA B/L, Normal WOB  Cardiovascular: RRR, No murmur  GI: Soft, Non- tender, BS- normoactive  Neuro: CN- grossly intact    Discharge Disposition   Discharged to long-term care facility  Condition at discharge: Stable    Consultations This Hospital Stay   NEUROLOGY IP CONSULT  SOCIAL WORK IP CONSULT  PHYSICAL THERAPY ADULT IP CONSULT  OCCUPATIONAL THERAPY ADULT IP CONSULT  SPEECH LANGUAGE PATH ADULT IP CONSULT  PHYSICAL THERAPY ADULT IP CONSULT  OCCUPATIONAL THERAPY ADULT IP CONSULT    Time Spent on this Encounter   Lee SALVADOR, personally saw the patient today and spent greater than 30 minutes discharging this patient.    Discharge Orders     General info for SNF   Length of Stay Estimate: Short Term Care: Estimated # of Days <30  Condition at Discharge: Improving  Level of care:skilled   Rehabilitation Potential: Good  Admission H&P remains valid and up-to-date: Yes  Recent Chemotherapy: N/A  Use Nursing Home Standing Orders: N/A     Mantoux instructions   Give two-step Mantoux (PPD) Per Facility Policy Yes     Follow Up and recommended labs and tests   Follow up with Nursing home physician.     Activity - Up with nursing assistance     Reason for your hospital stay   Acute encephalopathy due to UTI     DNR/DNI     Physical Therapy Adult Consult   Evaluate and treat as clinically indicated.    Reason:     Occupational Therapy Adult Consult   Evaluate and treat as clinically indicated.    Reason:     Fall precautions     Advance Diet as Tolerated   Follow this diet upon discharge: Orders Placed This  Encounter     Room Service     Combination Diet Dysphagia Diet Level 2: Mechan Altered; Thin Liquids (water, ice chips, juice, milk gelatin, ice cream, etc)         Discharge Medications   Current Discharge Medication List      START taking these medications    Details   cephALEXin (KEFLEX) 500 MG capsule Take 1 capsule (500 mg) by mouth 3 times daily for 5 days  Qty: 15 capsule, Refills: 0    Associated Diagnoses: Dysuria         CONTINUE these medications which have CHANGED    Details   traMADol (ULTRAM) 50 MG tablet Take 2 tablets (100 mg) by mouth 3 times daily as needed  Qty: 30 tablet, Refills: 0    Associated Diagnoses: Arthritis         CONTINUE these medications which have NOT CHANGED    Details   LC-4 LIDOCAINE EX Externally apply topically 4 times daily as needed (pain) 4% Ointment      nystatin (MYCOSTATIN) cream Apply topically 2 times daily Apply to redness in groin folds/lower abdomen      Naproxen Sod-Diphenhydramine (ALEVE PM) 220-25 MG TABS Take 1 tablet by mouth At Bedtime      !! sennosides (SENOKOT) 8.6 MG tablet Take 2 tablets by mouth daily      !! sennosides (SENOKOT) 8.6 MG tablet Take by mouth daily as needed for constipation      sertraline (ZOLOFT) 25 MG tablet Take 1 tablet (25 mg) by mouth daily  Qty: 30 tablet    Associated Diagnoses: Depression      metoprolol (LOPRESSOR) 25 MG tablet Take 1 tablet (25 mg) by mouth daily  Qty: 60 tablet    Associated Diagnoses: Atrial fibrillation, unspecified      ACETAMINOPHEN PO Take 1,000 mg by mouth 3 times daily      alum & mag hydroxide-simethicone (MYLANTA ES/MAALOX  ES) 400-400-40 MG/5ML SUSP Take 15 mLs by mouth every 2 hours as needed for heartburn      AMOXICILLIN PO Take 2,000 mg by mouth one hour before dental appointment      ASPIRIN EC PO Take 81 mg by mouth daily      calcium-vitamin D (OSCAL) 250-125 MG-UNIT TABS per tablet Take 2 tablets by mouth 2 times daily      LISINOPRIL PO Take 10 mg by mouth daily (Patient takes 1/2 of 20 mg  tablet)      MENTHOL-METHYL SALICYLATE EX Apply thin layer 3 times daily as needed to low back, hip and/or knee for pain. Do not use at the same time as lidocaine ointment      Multiple Vitamins-Minerals (PRESERVISION AREDS 2) CAPS Take 1 tablet by mouth 2 times daily      OMEPRAZOLE PO Take 40 mg by mouth 2 times daily (Patient takes two 20mg capsules)      tamsulosin (FLOMAX) 0.4 MG 24 hr capsule Take 0.4 mg by mouth daily      psyllium (METAMUCIL/KONSYL) packet Take 1 packet by mouth daily as needed for constipation      MELATONIN PO Take 3 mg by mouth At Bedtime       VITAMIN D, CHOLECALCIFEROL, PO Take 1,000 Units by mouth daily       !! - Potential duplicate medications found. Please discuss with provider.        Allergies   No Known Allergies  Data   Most Recent 3 CBC's:  Recent Labs   Lab Test  12/12/17   0808  12/10/17   1000  12/09/17   0913   WBC  8.1  7.5  8.4   HGB  8.6*  8.7*  8.4*   MCV  88  88  87   PLT  127*  126*  159      Most Recent 3 BMP's:  Recent Labs   Lab Test  12/12/17   0808  12/10/17   1000  12/09/17   0913   NA  136  139  134   POTASSIUM  3.4  3.6  4.1   CHLORIDE  104  108  104   CO2  22  21  21   BUN  20  27  24   CR  1.01  1.23  1.28*   ANIONGAP  10  10  9   GIFTY  8.1*  8.2*  7.9*   GLC  99  138*  108*     Most Recent 2 LFT's:  Recent Labs   Lab Test  12/09/17   0913   AST  73*   ALT  20   ALKPHOS  69   BILITOTAL  0.3     Most Recent INR's and Anticoagulation Dosing History:  Anticoagulation Dose History     Recent Dosing and Labs Latest Ref Rng & Units 2/7/2016    INR 0.86 - 1.14 1.04        Most Recent 3 Troponin's:  Recent Labs   Lab Test  02/08/16   1514  02/08/16   1107  02/08/16   0641   TROPI  0.692*  0.938*  0.929*     Most Recent Cholesterol Panel:  Recent Labs   Lab Test  02/07/16   1710   CHOL  133   LDL  66   HDL  52   TRIG  73     Most Recent 6 Bacteria Isolates From Any Culture (See EPIC Reports for Culture Details):  Recent Labs   Lab Test  12/08/17   1656   CULT  10,000  to 50,000 colonies/mL  Coagulase negative Staphylococcus  *  Susceptibility testing requested by  Dr. Lashay Marino, for Bactram, Ceftriaxone, Cefpodoxime on the Coagulase negative staph,   @1620 12/11/17.DH.    Unable to test for Ceftriaxone and Cephpodoxime  Oxacillin susceptible Staph sp. are susceptible to the Cephalosporins     Most Recent TSH, T4 and A1c Labs:  Recent Labs   Lab Test  12/09/17   0913   TSH  1.12       Results for orders placed or performed during the hospital encounter of 02/07/16   XR Chest 2 Views    Narrative    XR CHEST 2 VW 2/7/2016 1:35 PM     HISTORY: New afib      Impression    IMPRESSION: The lungs appear clear. No pleural effusions.    TRICE MANZANO MD   Foot  XR, G/E 3 views, right    Narrative    RIGHT FOOT THREE OR MORE VIEWS 2/7/2016 1:35 PM     HISTORY: Foot pain.      Impression    IMPRESSION: Nondisplaced transverse fracture involving the fifth  metatarsal base distal to the intermetatarsal ligament. This type of  fracture could be associated with a higher incidence of nonunion.  Deformity of the fourth metatarsal neck is suspicious for a minimally  displaced fracture as well. Transverse sclerotic band in the third  metatarsal neck could represent a subtle nondisplaced fracture.  Finally, a transverse fracture involving the base of the great toe  proximal phalanx is also suspected. Diffuse osteopenia is noted.    TRICE MANZANO MD   Shoulder XR, G/E 3 views, right    Narrative    RIGHT SHOULDER THREE OR MORE VIEWS 2/7/2016 1:36 PM     HISTORY: Pain in shoulder.      Impression    IMPRESSION: Right shoulder arthroplasty. Narrowing of the space  between the prosthetic humeral head and undersurface of the acromion  raises the possibility of chronic rotator cuff tear.    TRICE MANZANO MD   CT Head w/o Contrast    Narrative    CT HEAD W/O CONTRAST 2/7/2016 1:39 PM     TECHNIQUE: 5 mm thick axial images of the head     COMPARISON: None.     HISTORY:  recent fall, genearlized  weakness, history of stroke     FINDINGS: No acute findings. No evidence for hemorrhage, mass lesion  or acute infarction. Small vessel ischemic or degenerative changes  both hemispheres. Generalized atrophy. CT head otherwise negative.      Impression    IMPRESSION: Old right temporal lobe infarct. Small vessel ischemic or  degenerative changes both hemispheres. Generalized atrophy. CT head  otherwise negative.    RAFAT LAZO MD   CT Head Neck Angio w/o & w Contrast    Narrative    CT ANGIOGRAM OF THE HEAD AND NECK WITHOUT AND WITH CONTRAST  2/7/2016  3:11 PM     HISTORY: Stroke symptoms after right shoulder surgery. Aphasia.    TECHNIQUE:  Precontrast localizing scans were followed by CT  angiography with an injection of  70 mL [id number]-370 IV with scans  through the head and neck.  Images were transferred to a separate 3-D  workstation where multiplanar reformations and 3-D images were  created.  Estimates of carotid stenoses are made relative to the  distal internal carotid artery diameters except as noted.         COMPARISON: None.    CT HEAD FINDINGS:  No contrast enhancing lesions.  Cerebral blood flow  is grossly normal.        CT ANGIOGRAM HEAD FINDINGS:  There is moderate stenosis in the distal  left internal carotid artery siphon. Left A1 segment is hypoplastic  with multiple small stenoses. Multiple mild intracranial stenoses are  noted. Venous circulation is unremarkable. No aneurysm is seen.    CT ANGIOGRAM NECK FINDINGS:   Right carotid artery: Mild calcified atherosclerotic plaque distal  common carotid and proximal external carotid.  No significant  stenosis.      Left carotid artery: Mild calcified atherosclerotic plaque distal  common carotid and proximal external carotid.  No significant  stenosis.      Vertebral arteries:       No significant stenosis.      Other findings: None.      Impression    IMPRESSION:   1. No evidence of arterial occlusion.  2. Moderate stenosis distal left internal  carotid artery siphon and  multiple small stenoses in the left A1 segment, right posterior  cerebral artery P1 segment, and to a lesser extent in small distal  branches of the anterior and middle cerebral arteries.     MARINA REED MD   MR Brain w/o Contrast    Narrative    MRI OF THE BRAIN WITHOUT CONTRAST 2/8/2016 9:31 AM     COMPARISON: Head CT 2/7/2016.    HISTORY:  Stroke.    TECHNIQUE: Sagittal T1-weighted, axial T2-weighted, axial FLAIR, and  axial diffusion-weighted MR images of the brain were acquired without  intravenous contrast.    FINDINGS: A chronic right PCA territory infarct is again noted. The  ventricles and basal cisterns are within normal limits in  configuration. There is no midline shift. There are no extra-axial  fluid collections. There is no evidence for acute stroke or for acute  intracranial hemorrhage.    There is no sinusitis or mastoiditis.      Impression    IMPRESSION:  1. Chronic right PCA territory infarct again noted.  2. Otherwise, normal brain MRI. No evidence for acute intracranial  pathology.    BENY ASH MD

## 2017-12-12 NOTE — PROGRESS NOTES
GEETA  D) Patient may be ready for discharge to TCU today.  I) Hudson back from [facility] with [facility], who reports patient does have SNF coverage from the [facility], after his Medicare coverage ends. Patient now has a 3 day inpatient stay for Medicare coverage, so he would use that benefit first. Daria also confirms [facility] is a current [facility] contracted facility.  Misti at [facility] has accepted patient for today.   P) Will update Misti once MD has seen patient to determine if the D/C will occur today.  Will also update son.    PAS-RR  Per [facility] regulation, GEETA completed and submitted PAS-RR to [place] [employer] Direct [employer] via the Senior Link[employer] Line.  PAS-RR confirmation # is :[id number]    Further questions may be directed to Senior LinkAge Line at #[phone], option #4 for PAS-RR staff.  Addendum  MD has completed D/C orders, which will be faxed to [facility] with the PAS and script. [facility] wheelchair ride to [facility] at 1530. Updated son, Eliseo, who agrees to this plan. He was informed patient will be billed for the wheelchair ride.

## 2017-12-12 NOTE — PLAN OF CARE
Problem: Patient Care Overview  Goal: Plan of Care/Patient Progress Review  Discharge Planner SLP   Patient plan for discharge: Patient did not state.   Current status:  Patient seen for swallow treatment while up in the chair for diet tolerance of a Dysphagia Diet level 2 with thin liquids. Pleasant and cooperative with treatment. He was able to take pills whole with water without overt Sx of aspiration. Mastication slow with solids and needed a small amount of liquid to assist with breaking down the bolus to swallow. No overt Sx of aspiration when implementing swallow strategies with mild verbal cues. Tolerating current diet, but not ready for a diet advancement. Recommend: 1. Continue on the DDL 2 with thin liquids. 2. Upright, small bites/sips, alternate liquids/solids. 3. SLP will continue to follow for safe diet advance and swallow strategies.   Barriers to return to prior living situation: Deconditioning.  Recommendations for discharge: TCU  Rationale for recommendations: TCU with continued ST for short term for diet advancement as tolerated. Not at baseline diet.        Entered by: Teri Lucas 12/12/2017 10:16 AM

## 2017-12-13 NOTE — PROGRESS NOTES
[place] [facility]  PRIMARY CARE PROVIDER AND CLINIC:  Teddy Sahu [facility] [address] / [facility] [id number]  Chief Complaint   Patient presents with     Hospital F/U       HPI:    Olaf Fierro is a 89 year old  (12/6/1920),admitted to the [facility] from [facility].  Hospital stay 12/8/17 through 12/12/17.  Admitted to this facility for  rehab, medical management and nursing care.     Hospital Course Per [facility] Discharge Summary 12/12/17:     Olaf Fierro is a 89 year old male who was admitted on 12/8/2017. He presented with a past medical history significant for arthritis, benign prostatic hypertrophy, cerebral infarction x2 with paroxsymal atrial fibrillation, CKD, GERD, hypertension and insomnia.  He was brought to the ED because he has having worsening encephalopathy.  He had several prior CVAs and there was concern this is another CVA.  The patient was able to answer basic questions but was often not felt to be fully involved in the conversation which per his family is a new change this week.  He was last seen by family normal on Wednesday, his birthday where he turned 89. Of note, he had been able to be mobile with a motorized scooter.        Acute metabolic encephalopathy  suspected acute CVA, history of multiple CVAs  Coagulase Negative Staph UTI  -the most likely cause of acute encephalopathy was UTI versus possible stroke  -UTI was initially treated with IV antibiotics, based on sensitivities, changed to Keflex, discussed with Dr. Garrison; infectious disease on the day of discharge.  -Neurology was consulted with concern for acute CVA however family did not want to pursue any further workup regarding that.  -family understood that this is possibly another stroke.   -Family most concerned with PT and OT evaluation for possible change in placement.   -Patient will be discharged to TCU  -by the day of discharge encephalopathy  was improving      HPI information obtained from: facility chart records, facility staff and patient report.        Current issues are:      Acute metabolic encephalopathy  See above. Patient says he feels much better. He has not felt confused at all. No behavioral concerns per staff. BIMS 12/15    Urinary tract infection without hematuria, site unspecified  Patient had not noted any symptoms prior to hospital stay. Currently denies dysuria, hematuria    Late effect of cerebrovascular accident  History of multiple CVAs. SLP recommended pureed diet with nectar thick liquids, he wished to sign a waiver for regular textures with thin.    Atrial fibrillation, unspecified type (H)  HR 67-76.     Arthritis  Patient takes tramadol at home. He has chronic pain in hips and knees. After discussion regarding 100mg being a high dose and risk for side effects, he does agree to decrease to 50mg and keep as prn.    Physical deconditioning  Just started working with PT/OT. Mod assist with transfers. Mod to max with ADLs. Goal is to return to A.L.     Benign prostatic hyperplasia, unspecified whether lower urinary tract symptoms present  Current regimen Flomax 0.4mg po qday.    CKD (chronic kidney disease) stage 3, GFR 30-59 ml/min  Creatinine   Date Value Ref Range Status   12/12/2017 1.01 0.66 - 1.25 mg/dL Final       Gastroesophageal reflux disease, esophagitis presence not specified  Denies dyspepsia    Essential hypertension  Since admitting 12/12, BP readings range:  116/57  145/69      CODE STATUS/ADVANCE DIRECTIVES DISCUSSION:   DNR / DNI  Patient's living condition: lives in an assisted living facility    ALLERGIES:Review of patient's allergies indicates no known allergies.  PAST MEDICAL HISTORY:  has a past medical history of Arthritis; Benign prostatic hypertrophy; Cerebral infarction (H); CKD (chronic kidney disease); GERD (gastroesophageal reflux disease); Hypertension; and Insomnia.  PAST SURGICAL HISTORY:  has a past  surgical history that includes orthopedic surgery; orthopedic surgery; and cataract iol, rt/lt.  FAMILY HISTORY: family history is not on file.  SOCIAL HISTORY:  reports that he has never smoked. He does not have any smokeless tobacco history on file. He reports that he does not drink alcohol or use illicit drugs.    Post Discharge Medication Reconciliation Status: discharge medications reconciled, continue medications without change.  Current Outpatient Prescriptions   Medication Sig Dispense Refill     nystatin (MYCOSTATIN) ointment Apply to Groin topically two times a day for Redness in groin folds lower abdomen.       cephALEXin (KEFLEX) 500 MG capsule Take 1 capsule (500 mg) by mouth 3 times daily for 5 days 15 capsule 0     traMADol (ULTRAM) 50 MG tablet Take 2 tablets (100 mg) by mouth 3 times daily as needed 30 tablet 0     LC-4 LIDOCAINE EX Externally apply topically 4 times daily as needed (pain) 4% Ointment       Naproxen Sod-Diphenhydramine (ALEVE PM) 220-25 MG TABS Take 1 tablet by mouth At Bedtime       sennosides (SENOKOT) 8.6 MG tablet Take 2 tablets by mouth daily       sertraline (ZOLOFT) 25 MG tablet Take 1 tablet (25 mg) by mouth daily 30 tablet      metoprolol (LOPRESSOR) 25 MG tablet Take 1 tablet (25 mg) by mouth daily 60 tablet      ACETAMINOPHEN PO Take 1,000 mg by mouth 3 times daily       alum & mag hydroxide-simethicone (MYLANTA ES/MAALOX  ES) 400-400-40 MG/5ML SUSP Take 15 mLs by mouth every 2 hours as needed for heartburn       AMOXICILLIN PO Take 2,000 mg by mouth one hour before dental appointment       ASPIRIN EC PO Take 81 mg by mouth daily       calcium-vitamin D (OSCAL) 250-125 MG-UNIT TABS per tablet Take 2 tablets by mouth 2 times daily       LISINOPRIL PO Take 10 mg by mouth daily (Patient takes 1/2 of 20 mg tablet)       MENTHOL-METHYL SALICYLATE EX Apply thin layer 3 times daily as needed to low back, hip and/or knee for pain. Do not use at the same time as lidocaine ointment        Multiple Vitamins-Minerals (PRESERVISION AREDS 2) CAPS Take 1 tablet by mouth 2 times daily       OMEPRAZOLE PO Take 40 mg by mouth 2 times daily (Patient takes two 20mg capsules)       tamsulosin (FLOMAX) 0.4 MG 24 hr capsule Take 0.4 mg by mouth daily       psyllium (METAMUCIL/KONSYL) packet Take 1 packet by mouth daily as needed for constipation       MELATONIN PO Take 3 mg by mouth At Bedtime        VITAMIN D, CHOLECALCIFEROL, PO Take 1,000 Units by mouth daily         ROS:  10 point ROS of systems including Constitutional, Eyes, Respiratory, Cardiovascular, Gastroenterology, Genitourinary, Integumentary, Muscularskeletal, Psychiatric were all negative except for pertinent positives noted in my HPI.    Exam:  /57  Pulse 67  Temp 98.3  F (36.8  C)  Resp 18  Wt 171 lb (77.6 kg)  SpO2 95%  BMI 27.6 kg/m2  GENERAL APPEARANCE:  Alert, in no distress, oriented  ENT:  Mouth and posterior oropharynx normal, moist mucous membranes, [place]  EYES:  EOM, conjunctivae, lids, pupils and irises normal  NECK:  No adenopathy,masses or thyromegaly  RESP:  respiratory effort and palpation of chest normal, lungs clear to auscultation , no respiratory distress  CV:  Palpation and auscultation of heart done , no edema, irregular rhythm (afib)  ABDOMEN:  normal bowel sounds, soft, nontender, no hepatosplenomegaly or other masses  SKIN:  Inspection of skin and subcutaneous tissue baseline, Palpation of skin and subcutaneous tissue baseline  PSYCH:  oriented X 3, normal insight, judgement and memory    Lab/Diagnostic data:     CBC RESULTS:   Recent Labs   Lab Test  12/12/17   0808  12/10/17   1000   WBC  8.1  7.5   RBC  2.96*  3.04*   HGB  8.6*  8.7*   HCT  25.9*  26.7*   MCV  88  88   MCH  29.1  28.6   MCHC  33.2  32.6   RDW  14.0  13.9   PLT  127*  126*       Last Basic Metabolic Panel:  Recent Labs   Lab Test  12/12/17   0808  12/10/17   1000   NA  136  139   POTASSIUM  3.4  3.6   CHLORIDE  104  108   GIFTY  8.1*  8.2*    CO2  22  21   BUN  20  27   CR  1.01  1.23   GLC  99  138*       Liver Function Studies -   Recent Labs   Lab Test  12/09/17   0913   PROTTOTAL  6.5*   ALBUMIN  3.0*   BILITOTAL  0.3   ALKPHOS  69   AST  73*   ALT  20       TSH   Date Value Ref Range Status   12/09/2017 1.12 0.40 - 4.00 mU/L Final   02/07/2016 1.98 0.40 - 4.00 mU/L Final           ASSESSMENT/PLAN:  (G93.41) Acute metabolic encephalopathy  (primary encounter diagnosis)  Comment: Improved  Plan: OT eval and treat. Monitor for AMS, behavioral concerns    (N39.0) Urinary tract infection without hematuria, site unspecified  Comment: Asymptomatic  Plan: Continue current POC with no changes at this time and adjustments as needed.    (I69.30) Late effect of cerebrovascular accident  Comment: History of multiple strokes. Has dysphagia.   Plan: Cont ASA, BP management. Not on a statin ?due to goals of care    (I48.91) Atrial fibrillation, unspecified type (H)  Comment: Rate controlled. No warfarin due to fall risk  Plan: Continue current POC with no changes at this time and adjustments as needed.    (M19.90) Arthritis  Comment: 100mg of tramadol on a regular basis is likely to cause side effects such as confusion, lethargy, urinary retention, constipation. Recommend lowering dose and using as little as necessary, patient in agreement  Plan: tramadol 50mg q6h prn    (R53.81) Physical deconditioning  Comment: Nonambulatory at baseline. Goal is to return to previous level of functioning and evaluate safety of returning to A.L.  Plan: PT/OT eval and treat, discharge planning per their recommendations.    (N40.0) Benign prostatic hyperplasia, unspecified whether lower urinary tract symptoms present  Comment: No s/sx retention. On flomax  Plan: Monitor for s/sx retention    (N18.3) CKD (chronic kidney disease) stage 3, GFR 30-59 ml/min  Comment: Chronic  Plan: Avoid nephrotoxic medications and adjust medications per renal function. Monitor renal function prn.      (K21.9) Gastroesophageal reflux disease, esophagitis presence not specified  Comment: Asymptomatic  Plan: Continue current POC with no changes at this time and adjustments as needed.    (I10) Essential hypertension  Comment: Too little data to assess. Recommend BP goal <140/90  Plan: Continue current POC with no changes at this time and adjustments as needed.          Electronically signed by:  CASIMIRO Ewing [facility]  Pager: [phone]

## 2017-12-13 NOTE — PLAN OF CARE
Problem: Patient Care Overview  Goal: Plan of Care/Patient Progress Review  Outcome: Adequate for Discharge Date Met: 12/13/17  Speech Language Therapy Discharge Summary    Reason for therapy discharge:    Discharged to transitional care facility.    Progress towards therapy goal(s). See goals on Care Plan in [facility] electronic health record for goal details.  Goals partially met.  Barriers to achieving goals:   discharge from facility.    Therapy recommendation(s):    Continued therapy is recommended.  Rationale/Recommendations:  Short term ST to determine least restrictive diet. Discharged on a DDL 2 with thin liquids. .

## 2017-12-14 ENCOUNTER — NURSING HOME VISIT (OUTPATIENT)
Dept: GERIATRICS | Facility: CLINIC | Age: 82
End: 2017-12-14

## 2017-12-14 VITALS
RESPIRATION RATE: 18 BRPM | BODY MASS INDEX: 27.6 KG/M2 | TEMPERATURE: 98.3 F | WEIGHT: 171 LBS | HEART RATE: 67 BPM | DIASTOLIC BLOOD PRESSURE: 57 MMHG | SYSTOLIC BLOOD PRESSURE: 116 MMHG | OXYGEN SATURATION: 95 %

## 2017-12-14 DIAGNOSIS — G93.41 ACUTE METABOLIC ENCEPHALOPATHY: Primary | ICD-10-CM

## 2017-12-14 DIAGNOSIS — K21.9 GASTROESOPHAGEAL REFLUX DISEASE, ESOPHAGITIS PRESENCE NOT SPECIFIED: ICD-10-CM

## 2017-12-14 DIAGNOSIS — R53.81 PHYSICAL DECONDITIONING: ICD-10-CM

## 2017-12-14 DIAGNOSIS — I69.30 LATE EFFECT OF CEREBROVASCULAR ACCIDENT: ICD-10-CM

## 2017-12-14 DIAGNOSIS — I48.91 ATRIAL FIBRILLATION, UNSPECIFIED TYPE (H): ICD-10-CM

## 2017-12-14 DIAGNOSIS — I10 ESSENTIAL HYPERTENSION: ICD-10-CM

## 2017-12-14 DIAGNOSIS — M19.90 ARTHRITIS: ICD-10-CM

## 2017-12-14 DIAGNOSIS — N40.0 BENIGN PROSTATIC HYPERPLASIA, UNSPECIFIED WHETHER LOWER URINARY TRACT SYMPTOMS PRESENT: ICD-10-CM

## 2017-12-14 DIAGNOSIS — N18.30 CKD (CHRONIC KIDNEY DISEASE) STAGE 3, GFR 30-59 ML/MIN (H): ICD-10-CM

## 2017-12-14 DIAGNOSIS — N39.0 URINARY TRACT INFECTION WITHOUT HEMATURIA, SITE UNSPECIFIED: ICD-10-CM

## 2017-12-14 PROCEDURE — 99310 SBSQ NF CARE HIGH MDM 45: CPT | Performed by: NURSE PRACTITIONER

## 2017-12-14 RX ORDER — NYSTATIN 100000 U/G
OINTMENT TOPICAL
COMMUNITY

## 2017-12-18 ENCOUNTER — NURSING HOME VISIT (OUTPATIENT)
Dept: GERIATRICS | Facility: CLINIC | Age: 82
End: 2017-12-18

## 2017-12-18 VITALS
BODY MASS INDEX: 26.95 KG/M2 | WEIGHT: 167 LBS | DIASTOLIC BLOOD PRESSURE: 59 MMHG | RESPIRATION RATE: 16 BRPM | OXYGEN SATURATION: 95 % | HEART RATE: 88 BPM | TEMPERATURE: 98.6 F | SYSTOLIC BLOOD PRESSURE: 122 MMHG

## 2017-12-18 DIAGNOSIS — G93.41 ACUTE METABOLIC ENCEPHALOPATHY: Primary | ICD-10-CM

## 2017-12-18 DIAGNOSIS — N18.30 CKD (CHRONIC KIDNEY DISEASE) STAGE 3, GFR 30-59 ML/MIN (H): ICD-10-CM

## 2017-12-18 DIAGNOSIS — N39.0 URINARY TRACT INFECTION WITHOUT HEMATURIA, SITE UNSPECIFIED: ICD-10-CM

## 2017-12-18 DIAGNOSIS — K21.9 GASTROESOPHAGEAL REFLUX DISEASE, ESOPHAGITIS PRESENCE NOT SPECIFIED: ICD-10-CM

## 2017-12-18 DIAGNOSIS — I69.30 LATE EFFECT OF CEREBROVASCULAR ACCIDENT: ICD-10-CM

## 2017-12-18 DIAGNOSIS — R53.81 PHYSICAL DECONDITIONING: ICD-10-CM

## 2017-12-18 DIAGNOSIS — N40.0 BENIGN PROSTATIC HYPERPLASIA, UNSPECIFIED WHETHER LOWER URINARY TRACT SYMPTOMS PRESENT: ICD-10-CM

## 2017-12-18 DIAGNOSIS — I48.91 ATRIAL FIBRILLATION, UNSPECIFIED TYPE (H): ICD-10-CM

## 2017-12-18 DIAGNOSIS — M19.90 ARTHRITIS: ICD-10-CM

## 2017-12-18 DIAGNOSIS — I10 ESSENTIAL HYPERTENSION: ICD-10-CM

## 2017-12-18 PROCEDURE — 99309 SBSQ NF CARE MODERATE MDM 30: CPT | Performed by: NURSE PRACTITIONER

## 2017-12-18 NOTE — PROGRESS NOTES
[place] [facility]    Chief Complaint   Patient presents with     RECHECK       HPI:    Olaf Fierro is a 89 year old  (12/6/1920), who is being seen today for an episodic care visit at [facility].  Hospital stay was at [facility] from 12/8/17 through 12/12/17. He presented to the ER with altered mental status. PMH significant for arthritis, benign prostatic hypertrophy, cerebral infarction x2 with paroxsymal atrial fibrillation, CKD, GERD, hypertension and insomnia. Uses motorized scooter at baseline, does not walk. CVA could not be ruled out as family did not want diagnostic testing. He was treated for a UTI and his mental status improved.    Admitted to this facility for  rehab, medical management and nursing care    HPI information obtained from: facility chart records, facility staff, patient report and [facility] chart review.      Today's concern is:  Acute metabolic encephalopathy  Patient says he feels much better. He has not felt confused at all. No behavioral concerns per staff. BIMS 12/15. Therapy is still completing CPT. He lives in an Assisted Living and plans to return there.    Urinary tract infection without hematuria, site unspecified  Patient had not noted any symptoms prior to hospital stay. Currently denies dysuria, hematuria    Late effect of cerebrovascular accident  History of multiple CVAs. SLP recommended pureed diet with nectar thick liquids, he wished to sign a waiver for regular textures with thin.     Atrial fibrillation, unspecified type (H)  HR 67-94.     Arthritis  Patient takes tramadol at home. He has chronic pain in hips and knees. After discussion regarding 100mg being a high dose and risk for side effects, he did agree to decrease to 50mg and keep as prn.    Physical deconditioning  Working with PT/OT. Mod assist with transfers. Mod to max with ADLs. Goal is to return to A.L. He is nonambulatory, only takes a step or two to transfer into  chair/scooter at baseline.    Benign prostatic hyperplasia, unspecified whether lower urinary tract symptoms present  Current regimen Flomax 0.4mg po qday.    CKD (chronic kidney disease) stage 3, GFR 30-59 ml/min  Creatinine   Date Value Ref Range Status   12/12/2017 1.01 0.66 - 1.25 mg/dL Final       Gastroesophageal reflux disease, esophagitis presence not specified  Denies dyspepsia    Essential hypertension  BP readings range:  122/59  133/82  116/57  145/69        ALLERGIES: Review of patient's allergies indicates no known allergies.  Past Medical, Surgical, Family and Social History reviewed and updated in [facility].    Current Outpatient Prescriptions   Medication Sig Dispense Refill     TRAMADOL HCL PO Take 50 mg by mouth every 6 hours as needed for moderate to severe pain       nystatin (MYCOSTATIN) ointment Apply to Groin topically two times a day for Redness in groin folds lower abdomen.       LC-4 LIDOCAINE EX Externally apply topically 4 times daily as needed (pain) 4% Ointment       Naproxen Sod-Diphenhydramine (ALEVE PM) 220-25 MG TABS Take 1 tablet by mouth At Bedtime       sennosides (SENOKOT) 8.6 MG tablet Take 2 tablets by mouth daily       sertraline (ZOLOFT) 25 MG tablet Take 1 tablet (25 mg) by mouth daily 30 tablet      metoprolol (LOPRESSOR) 25 MG tablet Take 1 tablet (25 mg) by mouth daily 60 tablet      ACETAMINOPHEN PO Take 1,000 mg by mouth 3 times daily       alum & mag hydroxide-simethicone (MYLANTA ES/MAALOX  ES) 400-400-40 MG/5ML SUSP Take 15 mLs by mouth every 2 hours as needed for heartburn       AMOXICILLIN PO Take 2,000 mg by mouth one hour before dental appointment       ASPIRIN EC PO Take 81 mg by mouth daily       calcium-vitamin D (OSCAL) 250-125 MG-UNIT TABS per tablet Take 2 tablets by mouth 2 times daily       LISINOPRIL PO Take 10 mg by mouth daily (Patient takes 1/2 of 20 mg tablet)       MENTHOL-METHYL SALICYLATE EX Apply thin layer 3 times daily as needed to low back, hip  and/or knee for pain. Do not use at the same time as lidocaine ointment       Multiple Vitamins-Minerals (PRESERVISION AREDS 2) CAPS Take 1 tablet by mouth 2 times daily       OMEPRAZOLE PO Take 40 mg by mouth 2 times daily (Patient takes two 20mg capsules)       tamsulosin (FLOMAX) 0.4 MG 24 hr capsule Take 0.4 mg by mouth daily       psyllium (METAMUCIL/KONSYL) packet Take 1 packet by mouth daily as needed for constipation       MELATONIN PO Take 3 mg by mouth At Bedtime        VITAMIN D, CHOLECALCIFEROL, PO Take 1,000 Units by mouth daily       Medications reviewed:  Medications reconciled to facility chart and changes were made to reflect current medications as identified as above med list.     REVIEW OF SYSTEMS:  10 point ROS of systems including Constitutional, Eyes, Respiratory, Cardiovascular, Gastroenterology, Genitourinary, Integumentary, Muscularskeletal, Psychiatric were all negative except for pertinent positives noted in my HPI.    Physical Exam:  /59  Pulse 88  Temp 98.6  F (37  C)  Resp 16  Wt 167 lb (75.8 kg)  SpO2 95%  BMI 26.95 kg/m2   GENERAL APPEARANCE:  Alert, in no distress, oriented  ENT:  Mouth and posterior oropharynx normal, moist mucous membranes, [place]  EYES:  EOM, conjunctivae, lids, pupils and irises normal  NECK:  No adenopathy,masses or thyromegaly  RESP:  respiratory effort and palpation of chest normal, lungs clear to auscultation , no respiratory distress  CV:  Palpation and auscultation of heart done , no edema, irregular rhythm (afib)  ABDOMEN:  normal bowel sounds, soft, nontender, no hepatosplenomegaly or other masses  SKIN:  Inspection of skin and subcutaneous tissue baseline, Palpation of skin and subcutaneous tissue baseline  PSYCH:  oriented X 3, normal insight, judgement and memory      Recent Labs:      CBC RESULTS:   Recent Labs   Lab Test  12/12/17   0808  12/10/17   1000   WBC  8.1  7.5   RBC  2.96*  3.04*   HGB  8.6*  8.7*   HCT  25.9*  26.7*   MCV  88  88    MCH  29.1  28.6   MCHC  33.2  32.6   RDW  14.0  13.9   PLT  127*  126*       Last Basic Metabolic Panel:  Recent Labs   Lab Test  12/12/17   0808  12/10/17   1000   NA  136  139   POTASSIUM  3.4  3.6   CHLORIDE  104  108   GIFTY  8.1*  8.2*   CO2  22  21   BUN  20  27   CR  1.01  1.23   GLC  99  138*       Liver Function Studies -   Recent Labs   Lab Test  12/09/17   0913   PROTTOTAL  6.5*   ALBUMIN  3.0*   BILITOTAL  0.3   ALKPHOS  69   AST  73*   ALT  20       TSH   Date Value Ref Range Status   12/09/2017 1.12 0.40 - 4.00 mU/L Final   02/07/2016 1.98 0.40 - 4.00 mU/L Final         Assessment/Plan:  (G93.41) Acute metabolic encephalopathy  (primary encounter diagnosis)  Comment: Improved  Plan: OT eval and treat. Monitor for AMS, behavioral concerns    (N39.0) Urinary tract infection without hematuria, site unspecified  Comment: Resolved    (I69.30) Late effect of cerebrovascular accident  Comment: History of multiple strokes. Has dysphagia.   Plan: Cont ASA, BP management. Not on a statin ?due to goals of care    (I48.91) Atrial fibrillation, unspecified type (H)  Comment: Rate controlled. No warfarin due to fall risk  Plan: Continue current POC with no changes at this time and adjustments as needed.    (M19.90) Arthritis  Comment: 100mg of tramadol on a regular basis is likely to cause side effects such as confusion, lethargy, urinary retention, constipation. Recommend lowering dose and using as little as necessary, patient in agreement  Plan: tramadol 50mg q6h prn    (R53.81) Physical deconditioning  Comment: Nonambulatory at baseline. Goal is to return to previous level of functioning and evaluate safety of returning to A.L.  Plan: PT/OT eval and treat, discharge planning per their recommendations.    (N40.0) Benign prostatic hyperplasia, unspecified whether lower urinary tract symptoms present  Comment: No s/sx retention. On flomax  Plan: Monitor for s/sx retention    (N18.3) CKD (chronic kidney disease)  stage 3, GFR 30-59 ml/min  Comment: Chronic  Plan: Avoid nephrotoxic medications and adjust medications per renal function. Monitor renal function prn.     (K21.9) Gastroesophageal reflux disease, esophagitis presence not specified  Comment: Asymptomatic  Plan: Continue current POC with no changes at this time and adjustments as needed.    (I10) Essential hypertension  Comment: Controlled. Recommend BP goal <140/90  Plan: Continue current POC with no changes at this time and adjustments as needed.        Electronically signed by  CASIMIRO Ewing [facility]  Pager: [phone]

## 2017-12-21 ENCOUNTER — NURSING HOME VISIT (OUTPATIENT)
Dept: GERIATRICS | Facility: CLINIC | Age: 82
End: 2017-12-21

## 2017-12-21 VITALS
WEIGHT: 166.8 LBS | TEMPERATURE: 98.6 F | HEART RATE: 88 BPM | OXYGEN SATURATION: 95 % | BODY MASS INDEX: 26.92 KG/M2 | RESPIRATION RATE: 16 BRPM | DIASTOLIC BLOOD PRESSURE: 59 MMHG | SYSTOLIC BLOOD PRESSURE: 122 MMHG

## 2017-12-21 DIAGNOSIS — G93.41 ACUTE METABOLIC ENCEPHALOPATHY: Primary | ICD-10-CM

## 2017-12-21 DIAGNOSIS — N18.30 CKD (CHRONIC KIDNEY DISEASE) STAGE 3, GFR 30-59 ML/MIN (H): ICD-10-CM

## 2017-12-21 DIAGNOSIS — I48.91 ATRIAL FIBRILLATION, UNSPECIFIED TYPE (H): ICD-10-CM

## 2017-12-21 DIAGNOSIS — I69.30 LATE EFFECT OF CEREBROVASCULAR ACCIDENT: ICD-10-CM

## 2017-12-21 DIAGNOSIS — N40.0 BENIGN PROSTATIC HYPERPLASIA, UNSPECIFIED WHETHER LOWER URINARY TRACT SYMPTOMS PRESENT: ICD-10-CM

## 2017-12-21 DIAGNOSIS — I10 ESSENTIAL HYPERTENSION: ICD-10-CM

## 2017-12-21 DIAGNOSIS — R53.81 PHYSICAL DECONDITIONING: ICD-10-CM

## 2017-12-21 DIAGNOSIS — K21.9 GASTROESOPHAGEAL REFLUX DISEASE, ESOPHAGITIS PRESENCE NOT SPECIFIED: ICD-10-CM

## 2017-12-21 PROCEDURE — 99309 SBSQ NF CARE MODERATE MDM 30: CPT | Performed by: NURSE PRACTITIONER

## 2017-12-21 NOTE — PROGRESS NOTES
[place] [facility]    Chief Complaint   Patient presents with     RECHECK       HPI:    Olaf Fierro is a 89 year old  (12/6/1920), who is being seen today for an episodic care visit at [facility].  Hospital stay was at [facility] from 12/8/17 through 12/12/17. He presented to the ER with altered mental status. PMH significant for arthritis, benign prostatic hypertrophy, cerebral infarction x2 with paroxsymal atrial fibrillation, CKD, GERD, hypertension and insomnia. Uses motorized scooter at baseline, does not walk. CVA could not be ruled out as family did not want diagnostic testing. He was treated for a UTI and his mental status improved.    Admitted to this facility for  rehab, medical management and nursing care    HPI information obtained from: facility chart records, facility staff, patient report and [facility] chart review.      Today's concern is:  Acute metabolic encephalopathy  Patient says he has not felt confused at all. No behavioral concerns per staff. BIMS 12/15. Therapy is still completing CPT. He lives in an Assisted Living and plans to return there.    Late effect of cerebrovascular accident  History of multiple CVAs. SLP recommended pureed diet with nectar thick liquids, he wished to sign a waiver for regular textures with thin. No issues noted by staff with coughing, SOB, hypoxia    Atrial fibrillation, unspecified type (H)  HR 67-94. No warfarin due to fall risk    Physical deconditioning  Working with PT/OT. Mod assist with transfers. Mod to max with ADLs. This is unchanged from last week, but therapy notes slight improvements. Goal is to return to A.L. He is nonambulatory at baseline, only takes a step or two to transfer into chair/scooter at baseline.    Benign prostatic hyperplasia, unspecified whether lower urinary tract symptoms present  Current regimen Flomax 0.4mg po qday.    CKD (chronic kidney disease) stage 3, GFR 30-59 ml/min  Creatinine    Date Value Ref Range Status   12/12/2017 1.01 0.66 - 1.25 mg/dL Final       Gastroesophageal reflux disease, esophagitis presence not specified  Denies dyspepsia    Essential hypertension  BP readings range:  122/59  133/82  116/57  145/69        ALLERGIES: Review of patient's allergies indicates no known allergies.  Past Medical, Surgical, Family and Social History reviewed and updated in [facility].    Current Outpatient Prescriptions   Medication Sig Dispense Refill     TRAMADOL HCL PO Take 50 mg by mouth every 6 hours as needed for moderate to severe pain       nystatin (MYCOSTATIN) ointment Apply to Groin topically two times a day for Redness in groin folds lower abdomen.       LC-4 LIDOCAINE EX Externally apply topically 4 times daily as needed (pain) 4% Ointment       Naproxen Sod-Diphenhydramine (ALEVE PM) 220-25 MG TABS Take 1 tablet by mouth At Bedtime       sennosides (SENOKOT) 8.6 MG tablet Take 2 tablets by mouth daily       sertraline (ZOLOFT) 25 MG tablet Take 1 tablet (25 mg) by mouth daily 30 tablet      metoprolol (LOPRESSOR) 25 MG tablet Take 1 tablet (25 mg) by mouth daily 60 tablet      ACETAMINOPHEN PO Take 1,000 mg by mouth 3 times daily       alum & mag hydroxide-simethicone (MYLANTA ES/MAALOX  ES) 400-400-40 MG/5ML SUSP Take 15 mLs by mouth every 2 hours as needed for heartburn       AMOXICILLIN PO Take 2,000 mg by mouth one hour before dental appointment       ASPIRIN EC PO Take 81 mg by mouth daily       calcium-vitamin D (OSCAL) 250-125 MG-UNIT TABS per tablet Take 2 tablets by mouth 2 times daily       LISINOPRIL PO Take 10 mg by mouth daily (Patient takes 1/2 of 20 mg tablet)       MENTHOL-METHYL SALICYLATE EX Apply thin layer 3 times daily as needed to low back, hip and/or knee for pain. Do not use at the same time as lidocaine ointment       Multiple Vitamins-Minerals (PRESERVISION AREDS 2) CAPS Take 1 tablet by mouth 2 times daily       OMEPRAZOLE PO Take 40 mg by mouth 2 times daily  (Patient takes two 20mg capsules)       tamsulosin (FLOMAX) 0.4 MG 24 hr capsule Take 0.4 mg by mouth daily       psyllium (METAMUCIL/KONSYL) packet Take 1 packet by mouth daily as needed for constipation       MELATONIN PO Take 3 mg by mouth At Bedtime        VITAMIN D, CHOLECALCIFEROL, PO Take 1,000 Units by mouth daily       Medications reviewed:  Medications reconciled to facility chart and changes were made to reflect current medications as identified as above med list.     REVIEW OF SYSTEMS:  10 point ROS of systems including Constitutional, Eyes, Respiratory, Cardiovascular, Gastroenterology, Genitourinary, Integumentary, Muscularskeletal, Psychiatric were all negative except for pertinent positives noted in my HPI.    Physical Exam:  /59  Pulse 88  Temp 98.6  F (37  C)  Resp 16  Wt 166 lb 12.8 oz (75.7 kg)  SpO2 95%  BMI 26.92 kg/m2   GENERAL APPEARANCE:  Alert, in no distress, oriented  ENT:  Mouth and posterior oropharynx normal, moist mucous membranes, [place]  EYES:  EOM, conjunctivae, lids, pupils and irises normal  NECK:  No adenopathy,masses or thyromegaly  RESP:  respiratory effort and palpation of chest normal, lungs clear to auscultation , no respiratory distress  CV:  Palpation and auscultation of heart done , no edema, irregular rhythm (afib)  ABDOMEN:  normal bowel sounds, soft, nontender, no hepatosplenomegaly or other masses  SKIN:  Inspection of skin and subcutaneous tissue baseline, Palpation of skin and subcutaneous tissue baseline  PSYCH:  oriented X 3, normal insight, judgement and memory      Recent Labs:      CBC RESULTS:   Recent Labs   Lab Test  12/12/17   0808  12/10/17   1000   WBC  8.1  7.5   RBC  2.96*  3.04*   HGB  8.6*  8.7*   HCT  25.9*  26.7*   MCV  88  88   MCH  29.1  28.6   MCHC  33.2  32.6   RDW  14.0  13.9   PLT  127*  126*       Last Basic Metabolic Panel:  Recent Labs   Lab Test  12/12/17   0808  12/10/17   1000   NA  136  139   POTASSIUM  3.4  3.6   CHLORIDE   104  108   GIFTY  8.1*  8.2*   CO2  22  21   BUN  20  27   CR  1.01  1.23   GLC  99  138*       Liver Function Studies -   Recent Labs   Lab Test  12/09/17   0913   PROTTOTAL  6.5*   ALBUMIN  3.0*   BILITOTAL  0.3   ALKPHOS  69   AST  73*   ALT  20       TSH   Date Value Ref Range Status   12/09/2017 1.12 0.40 - 4.00 mU/L Final   02/07/2016 1.98 0.40 - 4.00 mU/L Final         Assessment/Plan:  (G93.41) Acute metabolic encephalopathy  (primary encounter diagnosis)  Comment: Improved  Plan: OT eval and treat. Monitor for AMS, behavioral concerns    (I69.30) Late effect of cerebrovascular accident  Comment: History of multiple strokes. Has dysphagia.   Plan: Cont ASA, BP management. Not on a statin ?due to goals of care    (I48.91) Atrial fibrillation, unspecified type (H)  Comment: Rate controlled. No warfarin due to fall risk  Plan: Continue current POC with no changes at this time and adjustments as needed.    (R53.81) Physical deconditioning  Comment: Nonambulatory at baseline. Goal is to return to previous level of functioning and evaluate safety of returning to A.L. He is still below baseline.  Plan: PT/OT eval and treat, discharge planning per their recommendations.    (N40.0) Benign prostatic hyperplasia, unspecified whether lower urinary tract symptoms present  Comment: No s/sx retention. On flomax  Plan: Monitor for s/sx retention    (N18.3) CKD (chronic kidney disease) stage 3, GFR 30-59 ml/min  Comment: Chronic  Plan: Avoid nephrotoxic medications and adjust medications per renal function. Monitor renal function prn.     (K21.9) Gastroesophageal reflux disease, esophagitis presence not specified  Comment: Asymptomatic  Plan: Continue current POC with no changes at this time and adjustments as needed.    (I10) Essential hypertension  Comment: Controlled. Recommend BP goal <140/90  Plan: Continue current POC with no changes at this time and adjustments as needed.        Electronically signed by  Radha Skinner  CASIMIRO Jimenez [facility]  Pager: [phone]

## 2018-01-08 ENCOUNTER — NURSING HOME VISIT (OUTPATIENT)
Dept: GERIATRICS | Facility: CLINIC | Age: 83
End: 2018-01-08

## 2018-01-08 VITALS
TEMPERATURE: 98.2 F | SYSTOLIC BLOOD PRESSURE: 145 MMHG | HEART RATE: 67 BPM | OXYGEN SATURATION: 100 % | DIASTOLIC BLOOD PRESSURE: 71 MMHG | RESPIRATION RATE: 18 BRPM | WEIGHT: 213.4 LBS | BODY MASS INDEX: 34.44 KG/M2

## 2018-01-08 DIAGNOSIS — F41.8 DEPRESSION WITH ANXIETY: ICD-10-CM

## 2018-01-08 DIAGNOSIS — I48.20 CHRONIC ATRIAL FIBRILLATION (H): ICD-10-CM

## 2018-01-08 DIAGNOSIS — M15.0 PRIMARY OSTEOARTHRITIS INVOLVING MULTIPLE JOINTS: ICD-10-CM

## 2018-01-08 DIAGNOSIS — K59.01 SLOW TRANSIT CONSTIPATION: ICD-10-CM

## 2018-01-08 DIAGNOSIS — N30.00 ACUTE CYSTITIS WITHOUT HEMATURIA: Primary | ICD-10-CM

## 2018-01-08 DIAGNOSIS — Z86.73 H/O: CVA (CEREBROVASCULAR ACCIDENT): ICD-10-CM

## 2018-01-08 DIAGNOSIS — G47.01 INSOMNIA DUE TO MEDICAL CONDITION: ICD-10-CM

## 2018-01-08 DIAGNOSIS — R53.81 PHYSICAL DECONDITIONING: ICD-10-CM

## 2018-01-08 DIAGNOSIS — N40.0 BENIGN PROSTATIC HYPERPLASIA WITHOUT LOWER URINARY TRACT SYMPTOMS: ICD-10-CM

## 2018-01-08 DIAGNOSIS — I10 BENIGN ESSENTIAL HYPERTENSION: ICD-10-CM

## 2018-01-08 DIAGNOSIS — G93.41 METABOLIC ENCEPHALOPATHY: ICD-10-CM

## 2018-01-08 PROCEDURE — 99306 1ST NF CARE HIGH MDM 50: CPT | Performed by: INTERNAL MEDICINE

## 2018-01-08 NOTE — PROGRESS NOTES
[place] [facility]  INITIAL VISIT NOTE  January 8, 2018    PRIMARY CARE PROVIDER AND CLINIC:  Teddy Sahu [facility] [address] / [facility] [id number]    Chief Complaint   Patient presents with     Hospital F/U       HPI:    Olaf Fierro is a 89 year old  (12/6/1920) male who was [facility], 2018 for an initial visit. Medical history is notable for HTN, atrial fibrillation, CVA, BPH and CKD. He was hospitalized at [facility] from 12/8/17 to 12/12/17 where he presented with increasing confusion and was found to have a UTI with acute metabolic encephalopathy as well as possible CVA. Family did not wish to pursue workup for CVA. He was admitted to this facility for medical management and rehab.     Today, Mr. Fierro is seen in his room after breakfast. Notes R shoulder, hip and knee pain that is chronic. At home he puts Blue Emu on it and that seems to help. No chest pain or dyspnea. No abdominal pain. Hoping to get back home soon. Working with therapies.     CODE STATUS:   DNR / DNI    ALLERGIES:   No Known Allergies    PAST MEDICAL HISTORY:   Past Medical History:   Diagnosis Date     Arthritis      Benign prostatic hypertrophy      Cerebral infarction (H)      CKD (chronic kidney disease)      GERD (gastroesophageal reflux disease)      Hypertension      Insomnia        PAST SURGICAL HISTORY:   Past Surgical History:   Procedure Laterality Date     CATARACT IOL, RT/LT       ORTHOPEDIC SURGERY      right shoulder arthroplasty     ORTHOPEDIC SURGERY      lleft knee surgery       FAMILY HISTORY:   Reviewed and non-contributory    SOCIAL HISTORY:   Lives alone     MEDICATIONS:  Current Outpatient Prescriptions   Medication Sig Dispense Refill     TRAMADOL HCL PO Take 50 mg by mouth every 6 hours as needed for moderate to severe pain       nystatin (MYCOSTATIN) ointment Apply to Groin topically two times a day for Redness in groin folds lower  abdomen.       LC-4 LIDOCAINE EX Externally apply topically 4 times daily as needed (pain) 4% Ointment       Naproxen Sod-Diphenhydramine (ALEVE PM) 220-25 MG TABS Take 1 tablet by mouth At Bedtime       sennosides (SENOKOT) 8.6 MG tablet Take 2 tablets by mouth daily       sertraline (ZOLOFT) 25 MG tablet Take 1 tablet (25 mg) by mouth daily 30 tablet      metoprolol (LOPRESSOR) 25 MG tablet Take 1 tablet (25 mg) by mouth daily 60 tablet      ACETAMINOPHEN PO Take 1,000 mg by mouth 3 times daily       alum & mag hydroxide-simethicone (MYLANTA ES/MAALOX  ES) 400-400-40 MG/5ML SUSP Take 15 mLs by mouth every 2 hours as needed for heartburn       AMOXICILLIN PO Take 2,000 mg by mouth one hour before dental appointment       ASPIRIN EC PO Take 81 mg by mouth daily       calcium-vitamin D (OSCAL) 250-125 MG-UNIT TABS per tablet Take 2 tablets by mouth 2 times daily       LISINOPRIL PO Take 10 mg by mouth daily (Patient takes 1/2 of 20 mg tablet)       MENTHOL-METHYL SALICYLATE EX Apply thin layer 3 times daily as needed to low back, hip and/or knee for pain. Do not use at the same time as lidocaine ointment       Multiple Vitamins-Minerals (PRESERVISION AREDS 2) CAPS Take 1 tablet by mouth 2 times daily       OMEPRAZOLE PO Take 40 mg by mouth 2 times daily (Patient takes two 20mg capsules)       tamsulosin (FLOMAX) 0.4 MG 24 hr capsule Take 0.4 mg by mouth daily       psyllium (METAMUCIL/KONSYL) packet Take 1 packet by mouth daily as needed for constipation       MELATONIN PO Take 3 mg by mouth At Bedtime        VITAMIN D, CHOLECALCIFEROL, PO Take 1,000 Units by mouth daily         Post Discharge Medication Reconciliation Status: medication reconcilation previously completed during another office visit.    ROS:  10 point ROS neg other than the symptoms noted above in the HPI.    PHYSICAL EXAM:  /71  Pulse 67  Temp 98.2  F (36.8  C)  Resp 18  Wt 213 lb 6.4 oz (96.8 kg)  SpO2 100%  BMI 34.44 kg/m2  Gen:  sitting in wheelchair, alert, cooperative and in no acute distress  HEENT: normocephalic; oropharynx clear  Card: RRR, S1, S2, no murmurs  Resp: lungs clear to auscultation bilaterally  GI: abdomen soft, not-tender  MSK: normal muscle tone, no LE edema  Neuro: CX II-XII grossly in tact; ROM in all four extremities grossly in tact  Psych: alert and oriented x3; normal affect    LABORATORY/IMAGING DATA:  Reviewed as per Epic    ASSESSMENT/PLAN:    Coag Negative Staph UTI   Metabolic Encephalopathy  Afebrile. No urinary symptoms. Today, alert, interactive and communicating clearly.   -- follow clinically.     Osteoarthritis  Mainly of R knee, shoulder and hip. Reports using Blue Emu at home with good results  -- continues on acetaminophen 1000 mg TID and tramadol 50 mg q6h PRN  -- add Blue Emu ointment BID to R knee, R shoulder and R hip; OK for family to provide (suspect they will have to)    HTN  SBPs 110s-120s  -- continues on lisinopril 10 mg daily  -- follow BPs and adjust medications as needed    Chronic Atrial Fibrillation  HR 80s. Not on systemic anticoagulation.   -- rate controlled with metoprolol 25 mg daily  -- continues on ASA 81 mg daily    CVA  By history x2. Underlying a-fib. Is not on systemic anticoagulation as presumably risk > benefit.   -- continues on ASA 81 mg daily    BPH  -- continues on tamsulosin 0.4 mg daily     Depression / Anxiety  Mood and spirits good today  -- continues on sertraline 25 mg daily  -- supportive cares    Insomnia  -- continues on melatonin 3 mg qhs    GERD  -- continues on omeprazole 40 mg BID    Slow Transit Constipation  -- continues on psyllium daily PRN and and [place] 2 tabs daily  -- adjust bowel regimen as needed    Physical Deconditioning  In setting of hospitalization and underlying medical conditions  -- ongoing PT/OT      Electronically signed by:  Nora Morrow MD

## 2018-01-15 ENCOUNTER — DISCHARGE SUMMARY NURSING HOME (OUTPATIENT)
Dept: GERIATRICS | Facility: CLINIC | Age: 83
End: 2018-01-15

## 2018-01-15 VITALS
BODY MASS INDEX: 34.12 KG/M2 | RESPIRATION RATE: 20 BRPM | HEART RATE: 67 BPM | DIASTOLIC BLOOD PRESSURE: 51 MMHG | OXYGEN SATURATION: 100 % | TEMPERATURE: 97.1 F | SYSTOLIC BLOOD PRESSURE: 119 MMHG | WEIGHT: 211.4 LBS

## 2018-01-15 DIAGNOSIS — G93.41 ACUTE METABOLIC ENCEPHALOPATHY: Primary | ICD-10-CM

## 2018-01-15 DIAGNOSIS — N40.0 BENIGN PROSTATIC HYPERPLASIA, UNSPECIFIED WHETHER LOWER URINARY TRACT SYMPTOMS PRESENT: ICD-10-CM

## 2018-01-15 DIAGNOSIS — I69.30 LATE EFFECT OF CEREBROVASCULAR ACCIDENT: ICD-10-CM

## 2018-01-15 DIAGNOSIS — R53.81 PHYSICAL DECONDITIONING: ICD-10-CM

## 2018-01-15 DIAGNOSIS — I10 ESSENTIAL HYPERTENSION: ICD-10-CM

## 2018-01-15 DIAGNOSIS — K21.9 GASTROESOPHAGEAL REFLUX DISEASE, ESOPHAGITIS PRESENCE NOT SPECIFIED: ICD-10-CM

## 2018-01-15 DIAGNOSIS — N18.30 CKD (CHRONIC KIDNEY DISEASE) STAGE 3, GFR 30-59 ML/MIN (H): ICD-10-CM

## 2018-01-15 DIAGNOSIS — I48.91 ATRIAL FIBRILLATION, UNSPECIFIED TYPE (H): ICD-10-CM

## 2018-01-15 PROCEDURE — 99316 NF DSCHRG MGMT 30 MIN+: CPT | Performed by: NURSE PRACTITIONER

## 2018-01-15 NOTE — PROGRESS NOTES
[place] [facility] DISCHARGE SUMMARY    PATIENT'S NAME: Olaf Fierro  YOB: 1920  MEDICAL RECORD NUMBER:  [id number]    PRIMARY CARE PROVIDER AND CLINIC RESPONSIBLE AFTER TRANSFER: Teddy Sahu [facility] [address]  / [facility] [id number]     CODE STATUS/ADVANCE DIRECTIVES DISCUSSION:   DNR / DNI     No Known Allergies    TRANSFERRING PROVIDERS: CASIMIRO Ewnig CNP, Nora Morrow MD  DATE OF SNF ADMISSION:  December / 12 / 2017  DATE OF SNF (anticipated) DISCHARGE: January / 17 / 2018  DISCHARGE DISPOSITION: Non-FM[facility] Provider . Moving back to [facility] but now into the care suites  Nursing Facility: [facility] Hospital stay 12/8/17 to 12/12/17.     Condition on Discharge:  Stable.  Function: Mod assist with transfers, no walking  Cognitive Scores: BIMS 12/15        DISCHARGE DIAGNOSIS:   1. Acute metabolic encephalopathy    2. Late effect of cerebrovascular accident    3. Atrial fibrillation, unspecified type (H)    4. Physical deconditioning    5. Benign prostatic hyperplasia, unspecified whether lower urinary tract symptoms present    6. CKD (chronic kidney disease) stage 3, GFR 30-59 ml/min    7. Gastroesophageal reflux disease, esophagitis presence not specified    8. Essential hypertension        HPI Nursing Facility Course:  HPI information obtained from: facility chart records, facility staff and patient report. Hospital stay was at [facility] from 12/8/17 through 12/12/17. He presented to the ER with altered mental status. PMH significant for arthritis, benign prostatic hypertrophy, cerebral infarction x2 with paroxsymal atrial fibrillation, CKD, GERD, hypertension and insomnia. Uses motorized scooter at baseline, does not walk. CVA could not be ruled out as family did not want diagnostic testing. He was treated for a UTI and his mental status improved.    Acute metabolic  encephalopathy  Patient says he has not felt confused at all. No behavioral concerns per staff. BIMS 12/15.     Late effect of cerebrovascular accident  History of multiple CVAs. SLP recommended pureed diet with nectar thick liquids, he wished to sign a waiver for regular textures with thin. No issues noted by staff with coughing, SOB, hypoxia.    Atrial fibrillation, unspecified type (H)  HR 57-94. No warfarin due to fall risk    Physical deconditioning  Mod assist with transfers. Mod to max with ADLs. Goal was to return to A.L., but due to increased care needs, he will transition into the care suites where there is 24 hour CNA help. He is nonambulatory at baseline, only takes a step or two to transfer into chair/scooter at baseline.    Benign prostatic hyperplasia, unspecified whether lower urinary tract symptoms present  Current regimen Flomax 0.4mg po qday.    CKD (chronic kidney disease) stage 3, GFR 30-59 ml/min  Creatinine   Date Value Ref Range Status   12/12/2017 1.01 0.66 - 1.25 mg/dL Final       Gastroesophageal reflux disease, esophagitis presence not specified  Denies dyspepsia    Essential hypertension  BP readings range:  104/59  119/51  106/51  101/54  100/54  120/61  108/59  145/71  100/60  112/69        PAST MEDICAL HISTORY:  has a past medical history of Arthritis; Benign prostatic hypertrophy; Cerebral infarction (H); CKD (chronic kidney disease); GERD (gastroesophageal reflux disease); Hypertension; and Insomnia.    DISCHARGE MEDICATIONS:  Current Outpatient Prescriptions   Medication Sig Dispense Refill     Liniments (BLUE-EMU SUPER STRENGTH) CREA Apply to R knee topically as needed for Arthritis (R) shoulder & (R) hip twice daily       TRAMADOL HCL PO Take 50 mg by mouth every 6 hours as needed for moderate to severe pain       nystatin (MYCOSTATIN) ointment Apply to Groin topically two times a day for Redness in groin folds lower abdomen.       LC-4 LIDOCAINE EX Externally apply topically 4  times daily as needed (pain) 4% Ointment       Naproxen Sod-Diphenhydramine (ALEVE PM) 220-25 MG TABS Take 1 tablet by mouth At Bedtime       sennosides (SENOKOT) 8.6 MG tablet Take 2 tablets by mouth daily       sertraline (ZOLOFT) 25 MG tablet Take 1 tablet (25 mg) by mouth daily 30 tablet      metoprolol (LOPRESSOR) 25 MG tablet Take 1 tablet (25 mg) by mouth daily 60 tablet      ACETAMINOPHEN PO Take 1,000 mg by mouth 3 times daily       alum & mag hydroxide-simethicone (MYLANTA ES/MAALOX  ES) 400-400-40 MG/5ML SUSP Take 15 mLs by mouth every 2 hours as needed for heartburn       AMOXICILLIN PO Take 2,000 mg by mouth one hour before dental appointment       ASPIRIN EC PO Take 81 mg by mouth daily       calcium-vitamin D (OSCAL) 250-125 MG-UNIT TABS per tablet Take 2 tablets by mouth 2 times daily       LISINOPRIL PO Take 10 mg by mouth daily (Patient takes 1/2 of 20 mg tablet)       MENTHOL-METHYL SALICYLATE EX Apply thin layer 3 times daily as needed to low back, hip and/or knee for pain. Do not use at the same time as lidocaine ointment       Multiple Vitamins-Minerals (PRESERVISION AREDS 2) CAPS Take 1 tablet by mouth 2 times daily       OMEPRAZOLE PO Take 40 mg by mouth 2 times daily (Patient takes two 20mg capsules)       tamsulosin (FLOMAX) 0.4 MG 24 hr capsule Take 0.4 mg by mouth daily       psyllium (METAMUCIL/KONSYL) packet Take 1 packet by mouth daily as needed for constipation       MELATONIN PO Take 3 mg by mouth At Bedtime        VITAMIN D, CHOLECALCIFEROL, PO Take 1,000 Units by mouth daily         MEDICATION CHANGES/RATIONALE:   Tramadol decreased due to risk of side effects with higher dose       ROS:    10 point ROS of systems including Constitutional, Eyes, Respiratory, Cardiovascular, Gastroenterology, Genitourinary, Integumentary, Muscularskeletal, Psychiatric were all negative except for pertinent positives noted in my HPI.    Physical Exam:   Vitals: /51  Pulse 67  Temp 97.1  F  (36.2  C)  Resp 20  Wt 211 lb 6.4 oz (95.9 kg)  SpO2 100%  BMI 34.12 kg/m2  BMI= Body mass index is 34.12 kg/(m^2).  GENERAL APPEARANCE:  Alert, in no distress, oriented  ENT:  Mouth and posterior oropharynx normal, moist mucous membranes, [place]  EYES:  EOM, conjunctivae, lids, pupils and irises normal  NECK:  No adenopathy,masses or thyromegaly  RESP:  respiratory effort and palpation of chest normal, lungs clear to auscultation , no respiratory distress  CV:  Palpation and auscultation of heart done , no edema, irregular rhythm (afib)  ABDOMEN:  normal bowel sounds, soft, nontender, no hepatosplenomegaly or other masses  SKIN:  Inspection of skin and subcutaneous tissue baseline, Palpation of skin and subcutaneous tissue baseline  PSYCH:  oriented X 3, normal insight, judgement and memory      DISCHARGE PLAN:  [facility] suites with [facility] for RN, PT, OT, HHA  Patient instructed to follow-up with:  PCP in 7 days      [facility] scheduled appointments:  No future appointments.    MTM referral needed and placed by this provider: No    Pending labs: none    SNF labs     CBC RESULTS:   Recent Labs   Lab Test  12/12/17   0808  12/10/17   1000   WBC  8.1  7.5   RBC  2.96*  3.04*   HGB  8.6*  8.7*   HCT  25.9*  26.7*   MCV  88  88   MCH  29.1  28.6   MCHC  33.2  32.6   RDW  14.0  13.9   PLT  127*  126*       Last Basic Metabolic Panel:  Recent Labs   Lab Test  12/12/17   0808  12/10/17   1000   NA  136  139   POTASSIUM  3.4  3.6   CHLORIDE  104  108   GIFTY  8.1*  8.2*   CO2  22  21   BUN  20  27   CR  1.01  1.23   GLC  99  138*       Liver Function Studies -   Recent Labs   Lab Test  12/09/17   0913   PROTTOTAL  6.5*   ALBUMIN  3.0*   BILITOTAL  0.3   ALKPHOS  69   AST  73*   ALT  20       TSH   Date Value Ref Range Status   12/09/2017 1.12 0.40 - 4.00 mU/L Final   02/07/2016 1.98 0.40 - 4.00 mU/L Final         Discharge Treatments: none    TOTAL DISCHARGE TIME:   Greater than 30 minutes      Electronically signed by:  CASIMIRO [facility]  Pager: [phone]

## 2018-02-23 ENCOUNTER — RECORDS - HEALTHEAST (OUTPATIENT)
Dept: LAB | Facility: CLINIC | Age: 83
End: 2018-02-23

## 2018-02-23 LAB
ALBUMIN UR-MCNC: ABNORMAL MG/DL
APPEARANCE UR: ABNORMAL
BACTERIA #/AREA URNS HPF: ABNORMAL HPF
BILIRUB UR QL STRIP: NEGATIVE
COLOR UR AUTO: YELLOW
GLUCOSE UR STRIP-MCNC: NEGATIVE MG/DL
HGB UR QL STRIP: ABNORMAL
KETONES UR STRIP-MCNC: NEGATIVE MG/DL
LEUKOCYTE ESTERASE UR QL STRIP: ABNORMAL
NITRATE UR QL: POSITIVE
PH UR STRIP: 6.5 [PH] (ref 4.5–8)
RBC #/AREA URNS AUTO: ABNORMAL HPF
SP GR UR STRIP: 1.02 (ref 1–1.03)
SQUAMOUS #/AREA URNS AUTO: ABNORMAL LPF
UROBILINOGEN UR STRIP-ACNC: ABNORMAL
WBC #/AREA URNS AUTO: >100 HPF
WBC CLUMPS #/AREA URNS HPF: PRESENT /[HPF]

## 2018-02-25 LAB — BACTERIA SPEC CULT: ABNORMAL
